# Patient Record
Sex: MALE | Race: WHITE | NOT HISPANIC OR LATINO | Employment: OTHER | URBAN - METROPOLITAN AREA
[De-identification: names, ages, dates, MRNs, and addresses within clinical notes are randomized per-mention and may not be internally consistent; named-entity substitution may affect disease eponyms.]

---
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
## 2018-09-12 DIAGNOSIS — T17.320A CHOKING DUE TO FOOD (REGURGITATED): Primary | ICD-10-CM

## 2020-04-05 ENCOUNTER — HOSPITAL ENCOUNTER (INPATIENT)
Facility: HOSPITAL | Age: 85
LOS: 5 days | DRG: 177 | End: 2020-04-10
Attending: EMERGENCY MEDICINE | Admitting: EMERGENCY MEDICINE
Payer: MEDICARE

## 2020-04-05 DIAGNOSIS — U07.1 COVID-19 VIRUS INFECTION: ICD-10-CM

## 2020-04-05 DIAGNOSIS — J18.9 MULTIFOCAL PNEUMONIA: Primary | ICD-10-CM

## 2020-04-05 DIAGNOSIS — Z20.822 SUSPECTED COVID-19 VIRUS INFECTION: ICD-10-CM

## 2020-04-05 DIAGNOSIS — D64.9 ANEMIA REQUIRING TRANSFUSIONS: ICD-10-CM

## 2020-04-05 DIAGNOSIS — J44.9 CHRONIC OBSTRUCTIVE PULMONARY DISEASE, UNSPECIFIED COPD TYPE: ICD-10-CM

## 2020-04-05 PROBLEM — K21.9 GERD (GASTROESOPHAGEAL REFLUX DISEASE): Status: ACTIVE | Noted: 2020-04-05

## 2020-04-05 LAB
ABO + RH BLD: NORMAL
ALBUMIN SERPL BCP-MCNC: 2.6 G/DL (ref 3.5–5.2)
ALP SERPL-CCNC: 102 U/L (ref 55–135)
ALT SERPL W/O P-5'-P-CCNC: 53 U/L (ref 10–44)
AMORPH CRY UR QL COMP ASSIST: NORMAL
ANION GAP SERPL CALC-SCNC: 10 MMOL/L (ref 8–16)
AST SERPL-CCNC: 101 U/L (ref 10–40)
BACTERIA #/AREA URNS AUTO: NORMAL /HPF
BASOPHILS # BLD AUTO: 0.01 K/UL (ref 0–0.2)
BASOPHILS NFR BLD: 0.1 % (ref 0–1.9)
BILIRUB SERPL-MCNC: 0.9 MG/DL (ref 0.1–1)
BILIRUB UR QL STRIP: NEGATIVE
BLD GP AB SCN CELLS X3 SERPL QL: NORMAL
BUN SERPL-MCNC: 23 MG/DL (ref 8–23)
CALCIUM SERPL-MCNC: 8.2 MG/DL (ref 8.7–10.5)
CHLORIDE SERPL-SCNC: 104 MMOL/L (ref 95–110)
CK SERPL-CCNC: 544 U/L (ref 20–200)
CLARITY UR REFRACT.AUTO: ABNORMAL
CO2 SERPL-SCNC: 24 MMOL/L (ref 23–29)
COLOR UR AUTO: YELLOW
CREAT SERPL-MCNC: 1.5 MG/DL (ref 0.5–1.4)
CREAT UR-MCNC: 117 MG/DL (ref 23–375)
CRP SERPL-MCNC: 191.5 MG/L (ref 0–8.2)
DIFFERENTIAL METHOD: ABNORMAL
EOSINOPHIL # BLD AUTO: 0 K/UL (ref 0–0.5)
EOSINOPHIL NFR BLD: 0.5 % (ref 0–8)
ERYTHROCYTE [DISTWIDTH] IN BLOOD BY AUTOMATED COUNT: 24.3 % (ref 11.5–14.5)
EST. GFR  (AFRICAN AMERICAN): 48 ML/MIN/1.73 M^2
EST. GFR  (NON AFRICAN AMERICAN): 41.6 ML/MIN/1.73 M^2
FERRITIN SERPL-MCNC: 81 NG/ML (ref 20–300)
GLUCOSE SERPL-MCNC: 109 MG/DL (ref 70–110)
GLUCOSE UR QL STRIP: NEGATIVE
HCT VFR BLD AUTO: 22.7 % (ref 40–54)
HGB BLD-MCNC: 6.1 G/DL (ref 14–18)
HGB UR QL STRIP: ABNORMAL
HYALINE CASTS UR QL AUTO: 0 /LPF
IMM GRANULOCYTES # BLD AUTO: 0.16 K/UL (ref 0–0.04)
IMM GRANULOCYTES NFR BLD AUTO: 1.9 % (ref 0–0.5)
IRON SERPL-MCNC: 16 UG/DL (ref 45–160)
KETONES UR QL STRIP: NEGATIVE
LACTATE SERPL-SCNC: 0.9 MMOL/L (ref 0.5–2.2)
LDH SERPL L TO P-CCNC: 436 U/L (ref 110–260)
LEUKOCYTE ESTERASE UR QL STRIP: NEGATIVE
LYMPHOCYTES # BLD AUTO: 0.7 K/UL (ref 1–4.8)
LYMPHOCYTES NFR BLD: 8.4 % (ref 18–48)
MCH RBC QN AUTO: 19.1 PG (ref 27–31)
MCHC RBC AUTO-ENTMCNC: 26.9 G/DL (ref 32–36)
MCV RBC AUTO: 71 FL (ref 82–98)
MICROSCOPIC COMMENT: NORMAL
MONOCYTES # BLD AUTO: 0.5 K/UL (ref 0.3–1)
MONOCYTES NFR BLD: 5.5 % (ref 4–15)
NEUTROPHILS # BLD AUTO: 7 K/UL (ref 1.8–7.7)
NEUTROPHILS NFR BLD: 83.6 % (ref 38–73)
NITRITE UR QL STRIP: NEGATIVE
NRBC BLD-RTO: 2 /100 WBC
PH UR STRIP: 5 [PH] (ref 5–8)
PLATELET # BLD AUTO: 253 K/UL (ref 150–350)
PMV BLD AUTO: ABNORMAL FL (ref 9.2–12.9)
POTASSIUM SERPL-SCNC: 4.2 MMOL/L (ref 3.5–5.1)
PROCALCITONIN SERPL IA-MCNC: 8.46 NG/ML
PROT SERPL-MCNC: 7.5 G/DL (ref 6–8.4)
PROT UR QL STRIP: ABNORMAL
PROT UR-MCNC: 65 MG/DL (ref 0–15)
PROT/CREAT UR: 0.56 MG/G{CREAT} (ref 0–0.2)
RBC # BLD AUTO: 3.2 M/UL (ref 4.6–6.2)
RBC #/AREA URNS AUTO: 1 /HPF (ref 0–4)
RETICS/RBC NFR AUTO: 1.2 % (ref 0.4–2)
SARS-COV-2 RNA AMPLIFICATION, QUAL: POSITIVE
SATURATED IRON: 5 % (ref 20–50)
SODIUM SERPL-SCNC: 138 MMOL/L (ref 136–145)
SODIUM UR-SCNC: <20 MMOL/L (ref 20–250)
SP GR UR STRIP: 1.02 (ref 1–1.03)
SQUAMOUS #/AREA URNS AUTO: 0 /HPF
TOTAL IRON BINDING CAPACITY: 292 UG/DL (ref 250–450)
TRANSFERRIN SERPL-MCNC: 197 MG/DL (ref 200–375)
TROPONIN I SERPL DL<=0.01 NG/ML-MCNC: 0.02 NG/ML (ref 0–0.03)
URN SPEC COLLECT METH UR: ABNORMAL
UUN UR-MCNC: 776 MG/DL (ref 140–1050)
WBC # BLD AUTO: 8.35 K/UL (ref 3.9–12.7)
WBC #/AREA URNS AUTO: 3 /HPF (ref 0–5)

## 2020-04-05 PROCEDURE — 80053 COMPREHEN METABOLIC PANEL: CPT

## 2020-04-05 PROCEDURE — 63700000 PHARM REV CODE 250 ALT 637 W/O HCPCS: Performed by: PHYSICIAN ASSISTANT

## 2020-04-05 PROCEDURE — 85025 COMPLETE CBC W/AUTO DIFF WBC: CPT

## 2020-04-05 PROCEDURE — 86140 C-REACTIVE PROTEIN: CPT

## 2020-04-05 PROCEDURE — 93010 ELECTROCARDIOGRAM REPORT: CPT | Mod: ,,, | Performed by: INTERNAL MEDICINE

## 2020-04-05 PROCEDURE — 99285 EMERGENCY DEPT VISIT HI MDM: CPT | Mod: 25

## 2020-04-05 PROCEDURE — P9021 RED BLOOD CELLS UNIT: HCPCS

## 2020-04-05 PROCEDURE — 82550 ASSAY OF CK (CPK): CPT

## 2020-04-05 PROCEDURE — 82607 VITAMIN B-12: CPT

## 2020-04-05 PROCEDURE — 82570 ASSAY OF URINE CREATININE: CPT

## 2020-04-05 PROCEDURE — 83010 ASSAY OF HAPTOGLOBIN QUANT: CPT

## 2020-04-05 PROCEDURE — 86850 RBC ANTIBODY SCREEN: CPT

## 2020-04-05 PROCEDURE — 85045 AUTOMATED RETICULOCYTE COUNT: CPT

## 2020-04-05 PROCEDURE — 84540 ASSAY OF URINE/UREA-N: CPT

## 2020-04-05 PROCEDURE — 96374 THER/PROPH/DIAG INJ IV PUSH: CPT

## 2020-04-05 PROCEDURE — 87449 NOS EACH ORGANISM AG IA: CPT

## 2020-04-05 PROCEDURE — U0002 COVID-19 LAB TEST NON-CDC: HCPCS

## 2020-04-05 PROCEDURE — 93005 ELECTROCARDIOGRAM TRACING: CPT

## 2020-04-05 PROCEDURE — 83605 ASSAY OF LACTIC ACID: CPT

## 2020-04-05 PROCEDURE — 84300 ASSAY OF URINE SODIUM: CPT

## 2020-04-05 PROCEDURE — 93010 EKG 12-LEAD: ICD-10-PCS | Mod: ,,, | Performed by: INTERNAL MEDICINE

## 2020-04-05 PROCEDURE — 63600175 PHARM REV CODE 636 W HCPCS: Performed by: PHYSICIAN ASSISTANT

## 2020-04-05 PROCEDURE — 86920 COMPATIBILITY TEST SPIN: CPT

## 2020-04-05 PROCEDURE — 83615 LACTATE (LD) (LDH) ENZYME: CPT

## 2020-04-05 PROCEDURE — 82960 TEST FOR G6PD ENZYME: CPT

## 2020-04-05 PROCEDURE — 82746 ASSAY OF FOLIC ACID SERUM: CPT

## 2020-04-05 PROCEDURE — 84484 ASSAY OF TROPONIN QUANT: CPT

## 2020-04-05 PROCEDURE — 83880 ASSAY OF NATRIURETIC PEPTIDE: CPT

## 2020-04-05 PROCEDURE — 12000002 HC ACUTE/MED SURGE SEMI-PRIVATE ROOM

## 2020-04-05 PROCEDURE — 84145 PROCALCITONIN (PCT): CPT

## 2020-04-05 PROCEDURE — 83540 ASSAY OF IRON: CPT

## 2020-04-05 PROCEDURE — 99233 PR SUBSEQUENT HOSPITAL CARE,LEVL III: ICD-10-PCS | Mod: ,,, | Performed by: FAMILY MEDICINE

## 2020-04-05 PROCEDURE — 81001 URINALYSIS AUTO W/SCOPE: CPT

## 2020-04-05 PROCEDURE — 82728 ASSAY OF FERRITIN: CPT

## 2020-04-05 PROCEDURE — 87040 BLOOD CULTURE FOR BACTERIA: CPT | Mod: 59

## 2020-04-05 PROCEDURE — 85379 FIBRIN DEGRADATION QUANT: CPT

## 2020-04-05 PROCEDURE — 36430 TRANSFUSION BLD/BLD COMPNT: CPT

## 2020-04-05 PROCEDURE — 99285 EMERGENCY DEPT VISIT HI MDM: CPT | Mod: ,,, | Performed by: PHYSICIAN ASSISTANT

## 2020-04-05 PROCEDURE — 99285 PR EMERGENCY DEPT VISIT,LEVEL V: ICD-10-PCS | Mod: ,,, | Performed by: PHYSICIAN ASSISTANT

## 2020-04-05 PROCEDURE — 99233 SBSQ HOSP IP/OBS HIGH 50: CPT | Mod: ,,, | Performed by: FAMILY MEDICINE

## 2020-04-05 RX ORDER — AZITHROMYCIN 250 MG/1
250 TABLET, FILM COATED ORAL
Status: COMPLETED | OUTPATIENT
Start: 2020-04-06 | End: 2020-04-09

## 2020-04-05 RX ORDER — GLUCAGON 1 MG
1 KIT INJECTION
Status: DISCONTINUED | OUTPATIENT
Start: 2020-04-05 | End: 2020-04-10 | Stop reason: HOSPADM

## 2020-04-05 RX ORDER — CEFTRIAXONE 1 G/1
1 INJECTION, POWDER, FOR SOLUTION INTRAMUSCULAR; INTRAVENOUS
Status: COMPLETED | OUTPATIENT
Start: 2020-04-05 | End: 2020-04-05

## 2020-04-05 RX ORDER — SODIUM CHLORIDE 0.9 % (FLUSH) 0.9 %
10 SYRINGE (ML) INJECTION
Status: DISCONTINUED | OUTPATIENT
Start: 2020-04-05 | End: 2020-04-10 | Stop reason: HOSPADM

## 2020-04-05 RX ORDER — IBUPROFEN 200 MG
24 TABLET ORAL
Status: DISCONTINUED | OUTPATIENT
Start: 2020-04-05 | End: 2020-04-10 | Stop reason: HOSPADM

## 2020-04-05 RX ORDER — SODIUM CHLORIDE 0.9 % (FLUSH) 0.9 %
10 SYRINGE (ML) INJECTION
Status: CANCELLED | OUTPATIENT
Start: 2020-04-05

## 2020-04-05 RX ORDER — HYDROCODONE BITARTRATE AND ACETAMINOPHEN 500; 5 MG/1; MG/1
TABLET ORAL
Status: DISCONTINUED | OUTPATIENT
Start: 2020-04-05 | End: 2020-04-10 | Stop reason: HOSPADM

## 2020-04-05 RX ORDER — ALBUTEROL SULFATE 90 UG/1
2 AEROSOL, METERED RESPIRATORY (INHALATION) EVERY 8 HOURS
Status: DISCONTINUED | OUTPATIENT
Start: 2020-04-06 | End: 2020-04-07

## 2020-04-05 RX ORDER — ENOXAPARIN SODIUM 100 MG/ML
30 INJECTION SUBCUTANEOUS NIGHTLY
Status: DISCONTINUED | OUTPATIENT
Start: 2020-04-06 | End: 2020-04-07

## 2020-04-05 RX ORDER — IBUPROFEN 200 MG
16 TABLET ORAL
Status: DISCONTINUED | OUTPATIENT
Start: 2020-04-05 | End: 2020-04-10 | Stop reason: HOSPADM

## 2020-04-05 RX ORDER — AZITHROMYCIN 250 MG/1
500 TABLET, FILM COATED ORAL
Status: COMPLETED | OUTPATIENT
Start: 2020-04-05 | End: 2020-04-05

## 2020-04-05 RX ORDER — CEFTRIAXONE 1 G/1
1 INJECTION, POWDER, FOR SOLUTION INTRAMUSCULAR; INTRAVENOUS ONCE
Status: COMPLETED | OUTPATIENT
Start: 2020-04-06 | End: 2020-04-06

## 2020-04-05 RX ADMIN — AZITHROMYCIN MONOHYDRATE 500 MG: 250 TABLET ORAL at 10:04

## 2020-04-05 RX ADMIN — CEFTRIAXONE SODIUM 1 G: 1 INJECTION, POWDER, FOR SOLUTION INTRAMUSCULAR; INTRAVENOUS at 10:04

## 2020-04-06 LAB
BASOPHILS # BLD AUTO: 0.02 K/UL (ref 0–0.2)
BASOPHILS NFR BLD: 0.2 % (ref 0–1.9)
BNP SERPL-MCNC: 48 PG/ML (ref 0–99)
D DIMER PPP IA.FEU-MCNC: 0.91 MG/L FEU
DIFFERENTIAL METHOD: ABNORMAL
EOSINOPHIL # BLD AUTO: 0 K/UL (ref 0–0.5)
EOSINOPHIL NFR BLD: 0.3 % (ref 0–8)
ERYTHROCYTE [DISTWIDTH] IN BLOOD BY AUTOMATED COUNT: 26.1 % (ref 11.5–14.5)
FOLATE SERPL-MCNC: 10.8 NG/ML (ref 4–24)
GLUCOSE-6-PHOSPHATE DEHYDROGENASE QUAL: ABNORMAL
HAPTOGLOB SERPL-MCNC: 400 MG/DL (ref 30–250)
HCT VFR BLD AUTO: 29.9 % (ref 40–54)
HGB BLD-MCNC: 8.4 G/DL (ref 14–18)
IMM GRANULOCYTES # BLD AUTO: 0.26 K/UL (ref 0–0.04)
IMM GRANULOCYTES NFR BLD AUTO: 2.4 % (ref 0–0.5)
INFLUENZA A, MOLECULAR: NEGATIVE
INFLUENZA B, MOLECULAR: NEGATIVE
LYMPHOCYTES # BLD AUTO: 0.8 K/UL (ref 1–4.8)
LYMPHOCYTES NFR BLD: 7 % (ref 18–48)
MCH RBC QN AUTO: 20.9 PG (ref 27–31)
MCHC RBC AUTO-ENTMCNC: 28.1 G/DL (ref 32–36)
MCV RBC AUTO: 74 FL (ref 82–98)
MONOCYTES # BLD AUTO: 0.5 K/UL (ref 0.3–1)
MONOCYTES NFR BLD: 4.3 % (ref 4–15)
NEUTROPHILS # BLD AUTO: 9.3 K/UL (ref 1.8–7.7)
NEUTROPHILS NFR BLD: 85.8 % (ref 38–73)
NRBC BLD-RTO: 1 /100 WBC
PLATELET # BLD AUTO: 273 K/UL (ref 150–350)
PMV BLD AUTO: ABNORMAL FL (ref 9.2–12.9)
RBC # BLD AUTO: 4.02 M/UL (ref 4.6–6.2)
SPECIMEN SOURCE: NORMAL
VIT B12 SERPL-MCNC: 790 PG/ML (ref 210–950)
WBC # BLD AUTO: 10.87 K/UL (ref 3.9–12.7)

## 2020-04-06 PROCEDURE — 27000221 HC OXYGEN, UP TO 24 HOURS

## 2020-04-06 PROCEDURE — 63700000 PHARM REV CODE 250 ALT 637 W/O HCPCS: Performed by: STUDENT IN AN ORGANIZED HEALTH CARE EDUCATION/TRAINING PROGRAM

## 2020-04-06 PROCEDURE — 99233 PR SUBSEQUENT HOSPITAL CARE,LEVL III: ICD-10-PCS | Mod: ,,, | Performed by: FAMILY MEDICINE

## 2020-04-06 PROCEDURE — 99233 SBSQ HOSP IP/OBS HIGH 50: CPT | Mod: ,,, | Performed by: FAMILY MEDICINE

## 2020-04-06 PROCEDURE — 36415 COLL VENOUS BLD VENIPUNCTURE: CPT

## 2020-04-06 PROCEDURE — 87502 INFLUENZA DNA AMP PROBE: CPT

## 2020-04-06 PROCEDURE — 94640 AIRWAY INHALATION TREATMENT: CPT

## 2020-04-06 PROCEDURE — 25000242 PHARM REV CODE 250 ALT 637 W/ HCPCS: Performed by: FAMILY MEDICINE

## 2020-04-06 PROCEDURE — 94761 N-INVAS EAR/PLS OXIMETRY MLT: CPT

## 2020-04-06 PROCEDURE — 63600175 PHARM REV CODE 636 W HCPCS: Performed by: FAMILY MEDICINE

## 2020-04-06 PROCEDURE — 63600175 PHARM REV CODE 636 W HCPCS: Performed by: STUDENT IN AN ORGANIZED HEALTH CARE EDUCATION/TRAINING PROGRAM

## 2020-04-06 PROCEDURE — 85025 COMPLETE CBC W/AUTO DIFF WBC: CPT

## 2020-04-06 PROCEDURE — 11000001 HC ACUTE MED/SURG PRIVATE ROOM

## 2020-04-06 RX ORDER — FLUTICASONE FUROATE AND VILANTEROL 200; 25 UG/1; UG/1
1 POWDER RESPIRATORY (INHALATION) DAILY
Status: DISCONTINUED | OUTPATIENT
Start: 2020-04-06 | End: 2020-04-10 | Stop reason: HOSPADM

## 2020-04-06 RX ORDER — CEFTRIAXONE 1 G/1
1 INJECTION, POWDER, FOR SOLUTION INTRAMUSCULAR; INTRAVENOUS
Status: COMPLETED | OUTPATIENT
Start: 2020-04-07 | End: 2020-04-09

## 2020-04-06 RX ADMIN — AZITHROMYCIN MONOHYDRATE 250 MG: 250 TABLET ORAL at 05:04

## 2020-04-06 RX ADMIN — CEFTRIAXONE SODIUM 1 G: 1 INJECTION, POWDER, FOR SOLUTION INTRAMUSCULAR; INTRAVENOUS at 05:04

## 2020-04-06 RX ADMIN — FLUTICASONE FUROATE AND VILANTEROL TRIFENATATE 1 PUFF: 200; 25 POWDER RESPIRATORY (INHALATION) at 12:04

## 2020-04-06 RX ADMIN — ENOXAPARIN SODIUM 30 MG: 100 INJECTION SUBCUTANEOUS at 07:04

## 2020-04-06 RX ADMIN — ALBUTEROL SULFATE 2 PUFF: 90 AEROSOL, METERED RESPIRATORY (INHALATION) at 09:04

## 2020-04-06 RX ADMIN — VANCOMYCIN HYDROCHLORIDE 750 MG: 750 INJECTION, POWDER, LYOPHILIZED, FOR SOLUTION INTRAVENOUS at 11:04

## 2020-04-06 RX ADMIN — ALBUTEROL SULFATE 2 PUFF: 90 AEROSOL, METERED RESPIRATORY (INHALATION) at 08:04

## 2020-04-06 RX ADMIN — IPRATROPIUM BROMIDE 2 PUFF: 17 AEROSOL, METERED RESPIRATORY (INHALATION) at 04:04

## 2020-04-06 RX ADMIN — IPRATROPIUM BROMIDE 2 PUFF: 17 AEROSOL, METERED RESPIRATORY (INHALATION) at 08:04

## 2020-04-06 NOTE — PROGRESS NOTES
Hospital Medicine  Progress Note  [facility]      Patient Name: Cedric Almaraz  MRN:  [id number]  Hospital Medicine Team: [facility] J Malachi Terrazas MD  Date of Admission:  4/5/2020     Length of Stay:  LOS: 1 day       Principal Problem:  <principal problem not specified>      HPI:  86yoM PMHx COPD, anemia, GERD presents to [facility] from [facility] for SOB. Patient with paperwork from a clinic stating that he had a chest x-ray performed day prior to admission which showed bibasilar infiltrates after complaining of dyspnea, his O2 sat at that time was 94%. The patient reports cough and pain in both shoulders but denies any other complaints including fever, chills, chest pain, abdominal pain, and nausea/vomiting/diarrhea.    Hospital Course:  Patient admitted for evaluation of possible COVID-19. Positive.    Interval History:     AAOx3. Goals of care discussion today with patient and expressing desire for full code at this time. Will touch base with family later today. Pt on 2L NC mid 90s sats. Afebrile. Pt anemic on arrival and Hgb recovered s/p 1U PRBC.    Review of Systems:  Respiratory: sob  Cardiovascular: no cp  GI: no diarrhea    Inpatient Medications:    Current Facility-Administered Medications:     0.9%  NaCl infusion (for blood administration), , Intravenous, Q24H PRN, Kerethi Nascimento PA-C    albuterol inhaler 2 puff, 2 puff, Inhalation, Q8H, 2 puff at 04/06/20 0907 **AND** MDI Q6H PRN, , , Q6H PRN, Malachi Terrazas MD    azithromycin tablet 250 mg, 250 mg, Oral, Q24H, Malachi Terrazas MD, 250 mg at 04/06/20 0501    dextrose 10% (D10W) Bolus, 12.5 g, Intravenous, PRN, Malachi Terrazas MD    dextrose 10% (D10W) Bolus, 25 g, Intravenous, PRN, Malachi Terrazas MD    enoxaparin injection 30 mg, 30 mg, Subcutaneous, QHS, Malachi Terrazas MD    fluticasone furoate-vilanteroL 200-25 mcg/dose diskus inhaler 1 puff, 1 puff, Inhalation, Daily, Malachi Terrazas,  "MD    glucagon (human recombinant) injection 1 mg, 1 mg, Intramuscular, PRN, Malachi Terrazas MD    glucose chewable tablet 16 g, 16 g, Oral, PRN, Malachi Terrazas MD    glucose chewable tablet 24 g, 24 g, Oral, PRN, Malachi Terrazas MD    ipratropium inhaler 2 puff, 2 puff, Inhalation, Q6H WAKE **AND** MDI Q6H PRN, , , Q6H PRN, Malachi Terrazas MD    sodium chloride 0.9% flush 10 mL, 10 mL, Intravenous, PRN, Malachi Terrazas MD      Physical Exam:    No intake or output data in the 24 hours ending 04/06/20 1042  Wt Readings from Last 3 Encounters:   04/06/20 61.2 kg (134 lb 14.7 oz)       /68 (BP Location: Left arm, Patient Position: Lying)   Pulse 78   Temp 97.8 °F (36.6 °C) (Oral)   Resp 20   Ht 5' 5" (1.651 m)   Wt 61.2 kg (134 lb 14.7 oz)   SpO2 (!) 90%   BMI 22.45 kg/m²     GEN: NAD, conversant  Resp: coarse bilateral breath sounds, normal wob on nc  CV: no edema  Skin: no rash    Laboratory:  Lab Results   Component Value Date    [id number] Positive (A) 04/05/2020       Recent Labs   Lab 04/05/20 2127 04/06/20  0717   WBC 8.35 10.87   LYMPH 8.4*  0.7* 7.0*  0.8*   HGB 6.1* 8.4*   HCT 22.7* 29.9*    273     Recent Labs   Lab 04/05/20 2127      K 4.2      CO2 24   BUN 23   CREATININE 1.5*      CALCIUM 8.2*     Recent Labs   Lab 04/05/20 2127   ALKPHOS 102   ALT 53*   *   ALBUMIN 2.6*   PROT 7.5   BILITOT 0.9        Recent Labs     04/05/20 2127   DDIMER 0.91*   FERRITIN 81   .5*   *   BNP 48   TROPONINI 0.022   *       All labs within the last 24 hours were reviewed.     Microbiology:  Microbiology Results (last 7 days)     Procedure Component Value Units Date/Time    Blood culture x two cultures. Draw prior to antibiotics. [[id number]] Collected:  04/05/20 2131    Order Status:  Completed Specimen:  Blood from Peripheral, Forearm, Right Updated:  04/06/20 0515     Blood Culture, Routine No Growth to date    Narrative:       Aerobic " and anaerobic    Blood culture x two cultures. Draw prior to antibiotics. [[id number]] Collected:  04/05/20 2130    Order Status:  Completed Specimen:  Blood from Peripheral, Antecubital, Right Updated:  04/06/20 0515     Blood Culture, Routine No Growth to date    Narrative:       Aerobic and anaerobic    Influenza A & B by Molecular [[id number]] Collected:  04/06/20 0011    Order Status:  Completed Specimen:  Nasopharyngeal Swab Updated:  04/06/20 0046     Influenza A, Molecular Negative     Influenza B, Molecular Negative     Flu A & B Source NP    Culture, Respiratory with Gram Stain [[id number]]     Order Status:  No result Specimen:  Sputum, Expectorated             Imaging  ECG Results          EKG 12-lead (Final result)  Result time 04/06/20 10:03:38    Final result by Interface, Lab In [place] (04/06/20 10:03:38)                 Narrative:    Test Reason : R68.89,    Vent. Rate : 055 BPM     Atrial Rate : 055 BPM     P-R Int : 132 ms          QRS Dur : 078 ms      QT Int : 482 ms       P-R-T Axes : 065 041 064 degrees     QTc Int : 461 ms    Sinus bradycardia with Premature atrial complexes  Otherwise normal ECG  When compared with ECG of 16-JAN-2012 12:33,  Premature atrial complexes are now Present  Confirmed by ANTONI MAN MD ([id number]) on 4/6/2020 10:03:30 AM    Referred By: [email or web address]   SELF           Confirmed By:ANTONI MAN MD                              No results found for this or any previous visit.    X-Ray Chest AP Portable  Narrative: EXAMINATION:  XR CHEST AP PORTABLE    CLINICAL HISTORY:  Suspected Covid-19 Virus Infection;    TECHNIQUE:  Single frontal view of the chest was performed.    COMPARISON:  01/16/2012.    FINDINGS:  Monitoring EKG leads are present. The trachea is unremarkable. There are calcifications of the aortic knob.  The cardiomediastinal silhouette is at the upper limits of normal. The right hemidiaphragm is unremarkable.  There is elevation of left hemidiaphragm.  There  is no evidence of free air beneath the hemidiaphragms. There is no evidence of a pneumothorax.  There is no evidence of pneumomediastinum.  There are bibasilar ground-glass airspace opacities.  There are degenerative changes in the osseous structures.  Impression: Bibasilar ground-glass airspace opacities.  The findings may be seen with viral or bacterial pneumonia.    Electronically signed by: Juan Best MD  Date:    04/05/2020  Time:    21:54      All imaging within the last 24 hours was reviewed.     Assessment and Plan:    Active Hospital Problems    Diagnosis  POA    COPD (chronic obstructive pulmonary disease) [J44.9]  Yes    Anemia [D64.9]  Yes    GERD (gastroesophageal reflux disease) [K21.9]  Yes    Multifocal pneumonia [J18.9]  Yes      Resolved Hospital Problems   No resolved problems to display.       Covid-19 Virus Infection  - COVID-19 testing: positive  - Infection Control notified    - Isolation:   - Airborne and Droplet Precautions  - Surgical mask on patient   - N95 masks must be fit tested, wear eye protection  - 20 second hand hygiene   - Limit visitors per hospital policy   - Consolidate lab draws, nursing care, and interventions    - Diagnostics: (Lymphopenia, hyponatremia, hyperferritinemia, elevated troponin, elevated d-dimer, age, and comorbidities are significant predictors of poor clinical outcome)   - CBC: Hgb 8.4 (6.1); Lymph 0.8  trend Q48hrs  - CMP:   Cr 1.5; elev LFTs     trend Q48hrs  - Procalcitonin:  - D-dimer:0.91  repeat prior to discharge  - Ferritin:81  repeat prior to discharge   - CRP:    191    trend Q48hrs  - LDH: 436  repeat prior to discharge  - BNP:48  - Troponin:wnl    - ECG:qtc 461   - rapid Flu:neg   - RIP only if BMT/solid transplant:   - Legionella antigen:   - Blood culture x2:ngtd   - Sputum culture:   - CXR:as above   - UA and culture:   - CPK:544    - Management:   Bundle care as able to maintain isolation & minimize in/out of room   - Supplemental O2 to  maintain SpO2 92%-96%   if requiring 6L NC or higher, place on nonrebreather and discuss case with MICU   - Telemetry & continuous pulse oximetry    - If wheezing   - albuterol inhaler 2-4 puff Q6hr PRN    - ipratropium daily    - acetaminophen 650mg PO Q6hr PRN fever   - loperamide PRN for viral diarrhea  - Empiric antibiotics per likely source & patient allergies    - CAP: x 5 day course (d/c early if low concern for bacterial co-infection)  Ceftriaxone 1g IV Q24hrs            Azithromycin 500mg IV day #1, then 250mg PO daily x4 days     If azithromycin is not available, start doxycycline                 If MRSA risk factors, add Vancomycin IV (PharmD consult)     - Investigational Treatment Protocol: (if patient meets criteria)   [email or web address]     - statin: atorvastatin 40mg po daily (if CPK WNL)    - start HCQ 400mg PO BID x1 day, then 400mg PO daily x 4 days   (check glucose 6 phosphate dehydrogenase (NOT G6PD Quant), ECG on start & @48hrs, and start Qshift POCT glucose)    Safety notes:   - Avoid NIPPV (CPAP/BiPAP) to prevent aerosolization, use on a case-by-case basis if in neg pressure room   - Cautious use of NSAIDS for fever per WHO recommendations (3/16/2020)   - No new ACEi/ARB start or discontinuation of chronic med unless hypotensive (Esler et al. Journal of Hypertension 2020, 38:000-000)   - Careful use of steroids in the absence of other indications (shock, ARDS)   - Fluid sparing resuscitation, avoid maintenance fluids           Advance Care Planning  Goals of care, counseling/discussion   full  Advance Care Planning      Anemia  - patient presents with microcytic anemia  - ED ordered 1U pRBCs --> Hgb recovered to 8.4, cont to monitor.     Elevated Creatinine   - pt with Cr 1.5 on admission, unknown baseline (last Cr 1.2 in Epic)  - trend CMP q48    If patient transitions to Comfort-Focused Care,  "please place "Nurse Communication: End of Life Care, family members allowed to visit, including spouse/partner and adult children [please list names]. Please ask family to visit as a group and leave as a group.       VTE High Risk Prophylaxis: enoxaparin 40mg sq QHS @ 2100 (bundled care) if GFR >30    Patient's chronic/stable medical conditions noted in the problem list above will be managed with the patient's home medications as tolerated.       Level 3 [id number] Total visit time was 35 minutes or greater with greater than 50% of time spent in counseling and coordination of care.       Malachi Terrazas MD  Spectralink: [phone]  Pager: [phone]          "

## 2020-04-06 NOTE — PLAN OF CARE
CM called [facility] for discharge planning.  Nurse at [facility] states patient is halfway and is wheelchair bound.  He is able to help a little bit but needed more help than usual, that is when they knew something was wrong.  CM received patient's daughter's information, Sophia Hayward [phone] or [phone].  Information placed in chart under demographics.  Plan is to discharge back to nursing home.    PCP:  Dave Goldberg MD    Payor: MEDICARE / Plan: MEDICARE PART A & B / Product Type: [facility] /      04/06/20 1235   Discharge Assessment   Assessment Type Discharge Planning Assessment   Confirmed/corrected address and phone number on facesheet? Yes   Assessment information obtained from? Caregiver   Expected Length of Stay (days) 3   Facility Arrived From: [facility]   Lives With facility resident   Is patient able to care for self after discharge? Unable to determine at this time (comments)   Patient's perception of discharge disposition nursing home   Readmission Within the Last 30 Days no previous admission in last 30 days   Patient currently being followed by outpatient case management? No   Patient currently receives any other outside agency services? No   Equipment Currently Used at Home wheelchair  (Nursing Home Equipment)   Do you have any problems affording any of your prescribed medications? No   Is the patient taking medications as prescribed? yes   Does the patient receive services at [facility]? No   Discharge Plan A Return to nursing home   Discharge Plan B New Nursing Home placement - halfway care facility   DME Needed Upon Discharge  none   Patient/Family in Agreement with Plan yes

## 2020-04-06 NOTE — ED PROVIDER NOTES
Encounter Date: 4/5/2020       History     Chief Complaint   Patient presents with    Shortness of Breath     SOB, low o2 sat 89% on RA, hx of pneumonia     86-year-old male with COPD, anemia, GERD presents via EMS from [facility] for shortness of breath.  History is obtained mainly from EMS provider and medical record as the patient is very poor historian.  Patient presents with paperwork from a clinic stating that he had a chest x-ray performed yesterday which showed bibasilar infiltrates and was mildly hypoxic with O2 saturation of 94% on room air.  The patient does endorse having a cough and pain in his right shoulder but denies any other complaints.  Denies fever, shortness of breath chest pain, abdominal pain, nausea/vomiting/diarrhea other complaints.        Review of patient's allergies indicates:  No Known Allergies  Past Medical History:   Diagnosis Date    Anemia 4/5/2020    GERD (gastroesophageal reflux disease) 4/5/2020     No past surgical history on file.  No family history on file.  Social History     Tobacco Use    Smoking status: Not on file   Substance Use Topics    Alcohol use: Not on file    Drug use: Not on file     Review of Systems   Constitutional: Negative for chills, fatigue and fever.   HENT: Negative for sore throat.    Respiratory: Positive for cough. Negative for shortness of breath.    Cardiovascular: Negative for chest pain.   Gastrointestinal: Negative for abdominal pain, diarrhea, nausea and vomiting.   Genitourinary: Negative for dysuria.   Musculoskeletal: Positive for arthralgias. Negative for back pain and myalgias.   Skin: Negative for rash.   Neurological: Negative for weakness.   Hematological: Does not bruise/bleed easily.       Physical Exam     Initial Vitals [04/05/20 2055]   BP Pulse Resp Temp SpO2   (!) 98/54 95 (!) 24 99.8 °F (37.7 °C) (!) 94 %      MAP       --         Physical Exam    Nursing note and vitals reviewed.  Constitutional: He  appears well-developed and well-nourished. He is not diaphoretic. No distress.   HENT:   Head: Normocephalic and atraumatic.   Eyes: EOM are normal. Pupils are equal, round, and reactive to light.   Neck: Normal range of motion. Neck supple.   Cardiovascular: Normal rate, regular rhythm, normal heart sounds and intact distal pulses. Exam reveals no gallop and no friction rub.    No murmur heard.  Pulmonary/Chest: Tachypnea noted. No respiratory distress.   Very mildly tachypneic, speaking full sentences without difficulty   Abdominal: Soft. He exhibits no distension. There is no tenderness.   Genitourinary: Rectal exam shows guaiac negative stool. Guaiac negative stool.   Genitourinary Comments: RN present as chaperone for rectal exam.  Light brown stool in rectal vault, no omer blood or melena.  Guaiac negative   Musculoskeletal: Normal range of motion.        Right lower leg: Normal.        Left lower leg: Normal.   Neurological: He is alert and oriented to person, place, and time.   Skin: Skin is warm and dry.   Psychiatric: He has a normal mood and affect.         ED Course   Procedures  Labs Reviewed   CBC W/ AUTO DIFFERENTIAL - Abnormal; Notable for the following components:       Result Value    RBC 3.20 (*)     Hemoglobin 6.1 (*)     Hematocrit 22.7 (*)     Mean Corpuscular Volume 71 (*)     Mean Corpuscular Hemoglobin 19.1 (*)     Mean Corpuscular Hemoglobin Conc 26.9 (*)     RDW 24.3 (*)     Immature Granulocytes 1.9 (*)     Immature Grans (Abs) 0.16 (*)     Lymph # 0.7 (*)     nRBC 2 (*)     Gran% 83.6 (*)     Lymph% 8.4 (*)     All other components within normal limits   COMPREHENSIVE METABOLIC PANEL - Abnormal; Notable for the following components:    Creatinine 1.5 (*)     Calcium 8.2 (*)     Albumin 2.6 (*)      (*)     ALT 53 (*)     eGFR if  48.0 (*)     eGFR if non  41.6 (*)     All other components within normal limits   C-REACTIVE PROTEIN - Abnormal;  Notable for the following components:    .5 (*)     All other components within normal limits   LACTATE DEHYDROGENASE - Abnormal; Notable for the following components:     (*)     All other components within normal limits   CK - Abnormal; Notable for the following components:     (*)     All other components within normal limits   PROCALCITONIN - Abnormal; Notable for the following components:    Procalcitonin 8.46 (*)     All other components within normal limits   SARS-COV-2 RNA AMPLIFICATION, QUAL - Abnormal; Notable for the following components:    SARS-CoV-2 RNA, Amplification, Qual Positive (*)     All other components within normal limits   URINALYSIS, REFLEX TO URINE CULTURE - Abnormal; Notable for the following components:    Appearance, UA Hazy (*)     Protein, UA 1+ (*)     Occult Blood UA 1+ (*)     All other components within normal limits    Narrative:     Preferred Collection Type->Urine, Clean Catch   CULTURE, BLOOD   CULTURE, BLOOD   FERRITIN   LACTIC ACID, PLASMA   TROPONIN I   URINALYSIS MICROSCOPIC    Narrative:     Preferred Collection Type->Urine, Clean Catch   IRON AND TIBC   FERRITIN   VITAMIN B12   FOLATE   LACTATE DEHYDROGENASE   HAPTOGLOBIN   RETICULOCYTES   TYPE & SCREEN   PREPARE RBC SOFT     EKG Readings: (Independently Interpreted)   Initial Reading: No STEMI. Previous EKG: Compared with most recent EKG Previous EKG Date: 1/16/2012. Rhythm: Sinus Bradycardia. Heart Rate: 55. Ectopy: PACs. ST Segments: Normal ST Segments. Clinical Impression: Sinus Bradycardia       Imaging Results          X-Ray Chest AP Portable (Final result)  Result time 04/05/20 21:54:11    Final result by Juan Best MD (04/05/20 21:54:11)                 Impression:      Bibasilar ground-glass airspace opacities.  The findings may be seen with viral or bacterial pneumonia.      Electronically signed by: Juan Best MD  Date:    04/05/2020  Time:    21:54             Narrative:     EXAMINATION:  XR CHEST AP PORTABLE    CLINICAL HISTORY:  Suspected Covid-19 Virus Infection;    TECHNIQUE:  Single frontal view of the chest was performed.    COMPARISON:  01/16/2012.    FINDINGS:  Monitoring EKG leads are present. The trachea is unremarkable. There are calcifications of the aortic knob.  The cardiomediastinal silhouette is at the upper limits of normal. The right hemidiaphragm is unremarkable.  There is elevation of left hemidiaphragm.  There is no evidence of free air beneath the hemidiaphragms. There is no evidence of a pneumothorax.  There is no evidence of pneumomediastinum.  There are bibasilar ground-glass airspace opacities.  There are degenerative changes in the osseous structures.                                 Medical Decision Making:   History:   Old Medical Records: I decided to obtain old medical records.  Old Records Summarized: other records.       <> Summary of Records: Patient presents with x-ray report that describes bibasilar infiltrates  Initial Assessment:   86-year-old male presenting from nursing facility for shortness of breath.  He is mildly hypoxic with O2 saturation down to 89% on room air improved to mid 90s on 4 liters/minute by nasal cannula.  His temperature is elevated 99.8 is afebrile.  Mildly tachypneic but able to speak in full sentences without difficulty.  Differential Diagnosis:   COVID-19 pneumonia  Bacterial pneumonia  COPD exacerbation  Low suspicion for influenza given no fever  Independently Interpreted Test(s):   I have ordered and independently interpreted X-rays - see summary below.       <> Summary of X-Ray Reading(s): Bibasilar ground-glass opacities  I have ordered and independently interpreted EKG Reading(s) - see prior notes  Clinical Tests:   Lab Tests: Ordered and Reviewed  Radiological Study: Ordered and Reviewed  Medical Tests: Ordered and Reviewed  ED Management:  Will check labs, do chest x-ray and reassess.    Chest x-ray shows bibasilar  pneumonia, COVID-19 positive.  Though pneumonia likely viral in nature, given significantly elevated procalcitonin will treat with ceftriaxone azithromycin given possibility of superinfection.  CBCs also notable for significant anemia with hemoglobin 6.1.  The patient does have anemia listed as a prior diagnosis but I do not have a baseline hemoglobin.  Guaiac is negative.  Will transfuse 1 unit.  Patient will be admitted to Hospital Medicine for further management.  Patient comfortable with admission.  I discussed this patient my supervising physician.                   ED Course as of Apr 05 2259   Sun Apr 05, 2020 2215 Chest x-ray shows bibasilar opacities.  Likely secondary to COVID-19, however will treat empirically with ceftriaxone azithromycin   X-Ray Chest AP Portable [CC]   2224 COVID positive   SARS-CoV-2 RNA, Rapid Amplification, Qual(!) [CC]   2249 Lactic Acid, Plasma [CC]   2258 Procalcitonin significantly elevated   Procalcitonin(!) [CC]      ED Course User Index  [CC] Keerthi Nascimento PA-C                Clinical Impression:       ICD-10-CM ICD-9-CM   1. Multifocal pneumonia J18.9 486   2. Suspected Covid-19 Virus Infection R68.89    3. COVID-19 virus infection U07.1    4. Anemia requiring transfusions D64.9 285.9         Disposition:   Disposition: Admitted  Condition: Fair                        Keerthi Nascimento PA-C  04/06/20 0009

## 2020-04-06 NOTE — ED TRIAGE NOTES
Arrived via EMS, POOR historian:  + Shortness of Breath (SOB, low o2 sat 89% on RA, hx of pneumonia)

## 2020-04-06 NOTE — ED NOTES
Attempted to call report, receiving floor req a 15 min. Call back as they have not assigned the patient as of yet.

## 2020-04-06 NOTE — H&P
Hospital Medicine  History and Physical  [facility]      Patient Name: Cedric Almaraz  MRN:  [id number]  Hospital Medicine Team: [facility] SORAIDA Greene MD  Date of Admission:  4/5/2020     Length of Stay:  LOS: 0 days     Principal Problem: Suspected Covid-19 Virus Infection    Chief complaint  dyspnea    HPI    Patient is a poor historian, thus history provided by chart review and provided by EMS by way of ED Physicians.     86 year old M with COPD, anemia, GERD presents to [facility] from [facility] for shortness of breath. Patient with paperwork from a clinic stating that he had a chest x-ray performed yesterday which showed bibasilar infiltrates after complaining of dyspnea, his O2 sat at that time was 94%. The patient reports cough and pain in both shoulders but denies any other complaints including fever, chills, chest pain, abdominal pain, and nausea/vomiting/diarrhea.    Review of Systems  Constitutional: Negative for fever, chills, fatigue, poor appetite   HENT: Negative for sore throat, negative for trouble swallowing.    Eyes: Negative for photophobia, visual disturbance.   Respiratory: Positive for shortness of breath, negative for cough  Cardiovascular: Negative for chest pain, palpitations, leg swelling.   Gastrointestinal: negative for diarrhea. Negative for abdominal pain, constipation, nausea, vomiting.   Endocrine: Negative for cold intolerance, heat intolerance.   Genitourinary: Negative for dysuria, frequency.   Musculoskeletal: Negative for arthralgias, myalgias.   Skin: Negative for rash  Neurological: Negative for dizziness, syncope, light-headedness.   Psychiatric/Behavioral: Negative for confusion, hallucinations, anxiety    Past Medical History:   Diagnosis Date    Anemia 4/5/2020    GERD (gastroesophageal reflux disease) 4/5/2020     History reviewed. No pertinent surgical history.  History reviewed. No pertinent family history.  Social  History     Socioeconomic History    Marital status: Unknown     Spouse name: Not on file    Number of children: Not on file    Years of education: Not on file    Highest education level: Not on file   Occupational History    Not on file   Social Needs    Financial resource strain: Not on file    Food insecurity:     Worry: Not on file     Inability: Not on file    Transportation needs:     Medical: Not on file     Non-medical: Not on file   Tobacco Use    Smoking status: Not on file   Substance and Sexual Activity    Alcohol use: Not on file    Drug use: Not on file    Sexual activity: Not on file   Lifestyle    Physical activity:     Days per week: Not on file     Minutes per session: Not on file    Stress: Not on file   Relationships    Social connections:     Talks on phone: Not on file     Gets together: Not on file     Attends Restorationist service: Not on file     Active member of club or organization: Not on file     Attends meetings of clubs or organizations: Not on file     Relationship status: Not on file   Other Topics Concern    Not on file   Social History Narrative    Not on file       Medications  No current facility-administered medications on file prior to encounter.      No current outpatient medications on file prior to encounter.       Allergies  Patient has no known allergies.    Physical Examination  Temp:  [99.8 °F (37.7 °C)]   Pulse:  [50-95]   Resp:  [19-28]   BP: ()/(54-59)   SpO2:  [90 %-95 %]     Gen: NAD, conversant  Head: NC, AT  Eyes: PERRLA, EOMI  Throat: MMM, OP clear  CV: RRR, no M/R/G, no peripheral edema, no JVD  Resp: coarse bilateral breath sounds, mild increased work of breathing on exam  GI: Soft, NT, ND, +BS  Ext: MAEW, no c/c/e  Neuro: AAOx3, CN grossly intact, no focal neurologic deficits  Psychiatry: Normal mood, normal affect    Laboratory:  Recent Labs   Lab 04/05/20 2127   WBC 8.35   LYMPH 8.4*  0.7*   HGB 6.1*   HCT 22.7*        Recent Labs    Lab 04/05/20 2127      K 4.2      CO2 24   BUN 23   CREATININE 1.5*      CALCIUM 8.2*     Recent Labs   Lab 04/05/20 2127   ALKPHOS 102   ALT 53*   *   ALBUMIN 2.6*   PROT 7.5   BILITOT 0.9      Recent Labs     04/05/20 2127   FERRITIN 81   .5*   *   TROPONINI 0.022   LACTATE 0.9       All labs within the last 24 hours were reviewed.     Microbiology:  Lab Results   Component Value Date    [id number] Positive (A) 04/05/2020       Microbiology Results (last 7 days)     Procedure Component Value Units Date/Time    Culture, Respiratory with Gram Stain [[id number]]     Order Status:  No result Specimen:  Sputum, Expectorated     Influenza A & B by Molecular [[id number]]     Order Status:  No result Specimen:  Nasopharyngeal Swab     Blood culture x two cultures. Draw prior to antibiotics. [[id number]] Collected:  04/05/20 2130    Order Status:  Sent Specimen:  Blood from Peripheral, Antecubital, Right Updated:  04/05/20 2152    Blood culture x two cultures. Draw prior to antibiotics. [[id number]] Collected:  04/05/20 2131    Order Status:  Sent Specimen:  Blood from Peripheral, Forearm, Right Updated:  04/05/20 2151            Imaging      No results found for this or any previous visit.    X-Ray Chest AP Portable  Narrative: EXAMINATION:  XR CHEST AP PORTABLE    CLINICAL HISTORY:  Suspected Covid-19 Virus Infection;    TECHNIQUE:  Single frontal view of the chest was performed.    COMPARISON:  01/16/2012.    FINDINGS:  Monitoring EKG leads are present. The trachea is unremarkable. There are calcifications of the aortic knob.  The cardiomediastinal silhouette is at the upper limits of normal. The right hemidiaphragm is unremarkable.  There is elevation of left hemidiaphragm.  There is no evidence of free air beneath the hemidiaphragms. There is no evidence of a pneumothorax.  There is no evidence of pneumomediastinum.  There are bibasilar ground-glass airspace opacities.  There  are degenerative changes in the osseous structures.  Impression: Bibasilar ground-glass airspace opacities.  The findings may be seen with viral or bacterial pneumonia.    Electronically signed by: Juan Best MD  Date:    04/05/2020  Time:    21:54      All imaging within the last 24 hours was reviewed.       Assessment and Plan:    Active Hospital Problems    Diagnosis  POA    COPD (chronic obstructive pulmonary disease) [J44.9]  Yes    Anemia [D64.9]  Yes    GERD (gastroesophageal reflux disease) [K21.9]  Yes    Multifocal pneumonia [J18.9]  Yes      Resolved Hospital Problems   No resolved problems to display.       Suspected Covid-19 Virus Infection  Person Under Investigation (PUI) for COVID-19  - COVID-19 testing:   - Infection Control notified    - Isolation:   - Airborne and Droplet Precautions  - Surgical mask on patient   - N95 masks must be fit tested, wear eye protection  - 20 second hand hygiene   - Limit visitors per hospital policy   - Consolidate lab draws, nursing care, and interventions    - Diagnostics: (Lymphopenia, hyponatremia, hyperferritinemia, elevated troponin, elevated d-dimer, age, and comorbidities are significant predictors of poor clinical outcome)   - CBC:   trend Q48hrs  - CMP:        trend Q48hrs  - Procalcitonin:  - D-dimer:  repeat prior to discharge  - Ferritin:  repeat prior to discharge   - CRP:        trend Q48hrs  - LDH:   repeat prior to discharge  - BNP:  - Troponin:    - ECG:   - rapid Flu:   - RIP only if BMT/solid transplant:   - Legionella antigen:   - Blood culture x2:   - Sputum culture:   - CXR:   - UA and culture:   - CPK:    - Management:   Bundle care as able to maintain isolation & minimize in/out of room   - Supplemental O2 to maintain SpO2 92%-96%   if requiring 6L NC or higher, place on nonrebreather and discuss case with MICU   - Telemetry & continuous pulse oximetry    - If wheezing   - albuterol inhaler 2-4 puff Q6hr PRN    - ipratropium daily    -  "acetaminophen 650mg PO Q6hr PRN fever   - loperamide PRN for viral diarrhea  - Empiric antibiotics per likely source & patient allergies    - CAP: x 5 day course (d/c early if low concern for bacterial co-infection)  Ceftriaxone 1g IV Q24hrs            Azithromycin 500mg IV day #1, then 250mg PO daily x4 days     If azithromycin is not available, start doxycycline                 If MRSA risk factors, add Vancomycin IV (PharmD consult)   - Investigational Treatment Protocol: (if patient meets criteria)   [email or web address]%[email or web address]%20Resources/[email or web address]_[email or web address]_Protocol.[email or web address]     - statin: atorvastatin 40mg po daily (if CPK WNL)    - start HCQ 400mg PO BID x1 day, then 400mg PO daily x 4 days   (check glucose 6 phosphate dehydrogenase (NOT G6PD Quant), ECG at start & 48hrs, and start Qshift POCT glucose)    Safety notes:   - Avoid NIPPV (CPAP/BiPAP) to prevent aerosolization, use on a case-by-case basis if in neg pressure room   - Cautious use of NSAIDS for fever per WHO recommendations (3/16/2020)   - No new ACEi/ARB start or discontinuation of chronic med unless hypotensive (Esler et al. Journal of Hypertension 2020, 38:000-000)   - Careful use of steroids in the absence of other indications (shock, ARDS)   - Fluid sparing resuscitation, avoid maintenance fluids     Anemia  - patient presents with microcytic anemia  - ED ordered 1U pRBCs  - repeat CBC  - iron studies and hemolysis labs ordered    Elevated Creatinine   - pt with Cr 1.5 on admission, unknown baseline (last Cr 1.2 in Epic)  - will obtain urine lytes and P:C ratio  - trend CMP q48      If patient transitions to Comfort-Focused Care, please place "Nurse Communication: End of Life Care, family members allowed to visit, including spouse/partner and adult children [please list names]. Please ask family to visit as a group and leave as a group.         VTE High Risk Prophylaxis: enoxaparin 40mg sq QHS @ 2100 " (bundled care) if GFR >30      H MD Ramona

## 2020-04-07 PROBLEM — U07.1 COVID-19 VIRUS INFECTION: Status: ACTIVE | Noted: 2020-04-07

## 2020-04-07 LAB
ALBUMIN SERPL BCP-MCNC: 2.5 G/DL (ref 3.5–5.2)
ALP SERPL-CCNC: 109 U/L (ref 55–135)
ALT SERPL W/O P-5'-P-CCNC: 53 U/L (ref 10–44)
ANION GAP SERPL CALC-SCNC: 11 MMOL/L (ref 8–16)
AST SERPL-CCNC: 75 U/L (ref 10–40)
BASOPHILS # BLD AUTO: 0.02 K/UL (ref 0–0.2)
BASOPHILS NFR BLD: 0.2 % (ref 0–1.9)
BILIRUB SERPL-MCNC: 0.8 MG/DL (ref 0.1–1)
BUN SERPL-MCNC: 15 MG/DL (ref 8–23)
CALCIUM SERPL-MCNC: 8.4 MG/DL (ref 8.7–10.5)
CHLORIDE SERPL-SCNC: 104 MMOL/L (ref 95–110)
CO2 SERPL-SCNC: 24 MMOL/L (ref 23–29)
CREAT SERPL-MCNC: 1.1 MG/DL (ref 0.5–1.4)
CRP SERPL-MCNC: 180.8 MG/L (ref 0–8.2)
DIFFERENTIAL METHOD: ABNORMAL
EOSINOPHIL # BLD AUTO: 0 K/UL (ref 0–0.5)
EOSINOPHIL NFR BLD: 0.4 % (ref 0–8)
ERYTHROCYTE [DISTWIDTH] IN BLOOD BY AUTOMATED COUNT: 26.4 % (ref 11.5–14.5)
EST. GFR  (AFRICAN AMERICAN): >60 ML/MIN/1.73 M^2
EST. GFR  (NON AFRICAN AMERICAN): >60 ML/MIN/1.73 M^2
GLUCOSE SERPL-MCNC: 89 MG/DL (ref 70–110)
HCT VFR BLD AUTO: 30.8 % (ref 40–54)
HGB BLD-MCNC: 8.7 G/DL (ref 14–18)
IMM GRANULOCYTES # BLD AUTO: 0.27 K/UL (ref 0–0.04)
IMM GRANULOCYTES NFR BLD AUTO: 2.9 % (ref 0–0.5)
LYMPHOCYTES # BLD AUTO: 0.8 K/UL (ref 1–4.8)
LYMPHOCYTES NFR BLD: 8.9 % (ref 18–48)
MAGNESIUM SERPL-MCNC: 2.5 MG/DL (ref 1.6–2.6)
MCH RBC QN AUTO: 20.9 PG (ref 27–31)
MCHC RBC AUTO-ENTMCNC: 28.2 G/DL (ref 32–36)
MCV RBC AUTO: 74 FL (ref 82–98)
MONOCYTES # BLD AUTO: 0.5 K/UL (ref 0.3–1)
MONOCYTES NFR BLD: 5.5 % (ref 4–15)
NEUTROPHILS # BLD AUTO: 7.6 K/UL (ref 1.8–7.7)
NEUTROPHILS NFR BLD: 82.1 % (ref 38–73)
NRBC BLD-RTO: 1 /100 WBC
PHOSPHATE SERPL-MCNC: 2.6 MG/DL (ref 2.7–4.5)
PLATELET # BLD AUTO: 313 K/UL (ref 150–350)
PMV BLD AUTO: ABNORMAL FL (ref 9.2–12.9)
POCT GLUCOSE: 100 MG/DL (ref 70–110)
POCT GLUCOSE: 121 MG/DL (ref 70–110)
POCT GLUCOSE: 92 MG/DL (ref 70–110)
POTASSIUM SERPL-SCNC: 3.9 MMOL/L (ref 3.5–5.1)
PROT SERPL-MCNC: 7.9 G/DL (ref 6–8.4)
RBC # BLD AUTO: 4.17 M/UL (ref 4.6–6.2)
SODIUM SERPL-SCNC: 139 MMOL/L (ref 136–145)
WBC # BLD AUTO: 9.24 K/UL (ref 3.9–12.7)

## 2020-04-07 PROCEDURE — 36415 COLL VENOUS BLD VENIPUNCTURE: CPT

## 2020-04-07 PROCEDURE — 99233 SBSQ HOSP IP/OBS HIGH 50: CPT | Mod: ,,, | Performed by: FAMILY MEDICINE

## 2020-04-07 PROCEDURE — 27100098 HC SPACER

## 2020-04-07 PROCEDURE — 25000242 PHARM REV CODE 250 ALT 637 W/ HCPCS: Performed by: FAMILY MEDICINE

## 2020-04-07 PROCEDURE — 80053 COMPREHEN METABOLIC PANEL: CPT

## 2020-04-07 PROCEDURE — 84100 ASSAY OF PHOSPHORUS: CPT

## 2020-04-07 PROCEDURE — 85025 COMPLETE CBC W/AUTO DIFF WBC: CPT

## 2020-04-07 PROCEDURE — 63700000 PHARM REV CODE 250 ALT 637 W/O HCPCS: Performed by: FAMILY MEDICINE

## 2020-04-07 PROCEDURE — 86140 C-REACTIVE PROTEIN: CPT

## 2020-04-07 PROCEDURE — 11000001 HC ACUTE MED/SURG PRIVATE ROOM

## 2020-04-07 PROCEDURE — 94761 N-INVAS EAR/PLS OXIMETRY MLT: CPT

## 2020-04-07 PROCEDURE — 63600175 PHARM REV CODE 636 W HCPCS: Performed by: FAMILY MEDICINE

## 2020-04-07 PROCEDURE — 94640 AIRWAY INHALATION TREATMENT: CPT

## 2020-04-07 PROCEDURE — 27000221 HC OXYGEN, UP TO 24 HOURS

## 2020-04-07 PROCEDURE — 83735 ASSAY OF MAGNESIUM: CPT

## 2020-04-07 PROCEDURE — 87040 BLOOD CULTURE FOR BACTERIA: CPT | Mod: 59

## 2020-04-07 PROCEDURE — 99233 PR SUBSEQUENT HOSPITAL CARE,LEVL III: ICD-10-PCS | Mod: ,,, | Performed by: FAMILY MEDICINE

## 2020-04-07 RX ORDER — ENOXAPARIN SODIUM 100 MG/ML
40 INJECTION SUBCUTANEOUS NIGHTLY
Status: DISCONTINUED | OUTPATIENT
Start: 2020-04-07 | End: 2020-04-10 | Stop reason: HOSPADM

## 2020-04-07 RX ORDER — ALBUTEROL SULFATE 90 UG/1
2 AEROSOL, METERED RESPIRATORY (INHALATION)
Status: DISCONTINUED | OUTPATIENT
Start: 2020-04-07 | End: 2020-04-09

## 2020-04-07 RX ADMIN — VANCOMYCIN HYDROCHLORIDE 750 MG: 750 INJECTION, POWDER, LYOPHILIZED, FOR SOLUTION INTRAVENOUS at 09:04

## 2020-04-07 RX ADMIN — ALBUTEROL SULFATE 2 PUFF: 90 AEROSOL, METERED RESPIRATORY (INHALATION) at 08:04

## 2020-04-07 RX ADMIN — ENOXAPARIN SODIUM 40 MG: 100 INJECTION SUBCUTANEOUS at 09:04

## 2020-04-07 RX ADMIN — ALBUTEROL SULFATE 2 PUFF: 90 AEROSOL, METERED RESPIRATORY (INHALATION) at 02:04

## 2020-04-07 RX ADMIN — CEFTRIAXONE SODIUM 1 G: 1 INJECTION, POWDER, FOR SOLUTION INTRAMUSCULAR; INTRAVENOUS at 08:04

## 2020-04-07 RX ADMIN — FLUTICASONE FUROATE AND VILANTEROL TRIFENATATE 1 PUFF: 200; 25 POWDER RESPIRATORY (INHALATION) at 07:04

## 2020-04-07 RX ADMIN — ALBUTEROL SULFATE 2 PUFF: 90 AEROSOL, METERED RESPIRATORY (INHALATION) at 07:04

## 2020-04-07 RX ADMIN — IPRATROPIUM BROMIDE 2 PUFF: 17 AEROSOL, METERED RESPIRATORY (INHALATION) at 08:04

## 2020-04-07 RX ADMIN — AZITHROMYCIN MONOHYDRATE 250 MG: 250 TABLET ORAL at 08:04

## 2020-04-07 RX ADMIN — IPRATROPIUM BROMIDE 2 PUFF: 17 AEROSOL, METERED RESPIRATORY (INHALATION) at 07:04

## 2020-04-07 RX ADMIN — IPRATROPIUM BROMIDE 2 PUFF: 17 AEROSOL, METERED RESPIRATORY (INHALATION) at 02:04

## 2020-04-07 NOTE — PLAN OF CARE
Addendum: 4-8-20 156pm    SW experienced technical issues this morning and afternoon causing inability to send electronic refs in [employer], issues were communicated to leadership team. MARIA ELENA also sent email correspondence in [employer] to tech team.  MARIA ELENA was able this afternoon to send ref by email to [facility]. MARIA ELENA will continue to follow for DC updates.     MARIA ELENA communicated to CM to relay message in secure chat that all prior facilties refs needed to be contacted this afternoon by a member of leadership team in efforts of expediting ref status of pt.     Lenka Velasco LMSW   Licensed Master Social Worker       MARIA ELENA called [facility] to inquire if pt can return to facility as , he has tested positive for COVID19. MARIA ELENA left message with [facility] front office staff for admissions to return call to me.     MARIA ELENA will send refs to local facilities accepting COVID19 pts in the event, pt is not allowed to return. CM updated.      Lenka Velasco LMSW   Licensed Master Social Worker

## 2020-04-07 NOTE — PROGRESS NOTES
Hospital Medicine  Progress Note  [facility]      Patient Name: Cedric Almaraz  MRN:  [id number]  Hospital Medicine Team: [facility] MED J Malachi Terrazas MD  Date of Admission:  4/5/2020     Length of Stay:  LOS: 2 days       Principal Problem:  COVID-19 virus infection      HPI:  86yoM PMHx COPD, anemia, GERD presents to [facility] from [facility] for SOB. Patient with paperwork from a clinic stating that he had a chest x-ray performed day prior to admission which showed bibasilar infiltrates after complaining of dyspnea, his O2 sat at that time was 94%. The patient reports cough and pain in both shoulders but denies any other complaints including fever, chills, chest pain, abdominal pain, and nausea/vomiting/diarrhea.    Hospital Course:  Patient admitted for evaluation of possible COVID-19. Positive.    Interval History:     4/7: AAOx3. Pt on 3L NC mid 90s sats. Afebrile. Relatively comfortable during visit today.    Review of Systems:  Respiratory: sob  Cardiovascular: no cp  GI: no diarrhea    Inpatient Medications:    Current Facility-Administered Medications:     0.9%  NaCl infusion (for blood administration), , Intravenous, Q24H PRN, Keerthi Nascimento PA-C    albuterol inhaler 2 puff, 2 puff, Inhalation, Q6H WAKE, 2 puff at 04/07/20 1451 **AND** MDI Q6H WAKE, , , Q6H WAKE, Malachi Terrazas MD    azithromycin tablet 250 mg, 250 mg, Oral, Q24H, Malachi Terrazas MD, 250 mg at 04/07/20 0845    cefTRIAXone injection 1 g, 1 g, Intravenous, Q24H, Malachi Terrazas MD, 1 g at 04/07/20 0845    dextrose 10% (D10W) Bolus, 12.5 g, Intravenous, PRN, Malachi Terrazas MD    dextrose 10% (D10W) Bolus, 25 g, Intravenous, PRN, Malachi Terrazas MD    enoxaparin injection 40 mg, 40 mg, Subcutaneous, QHS, Malachi Terrazas MD    fluticasone furoate-vilanteroL 200-25 mcg/dose diskus inhaler 1 puff, 1 puff, Inhalation, Daily, Malachi Terrazas MD, 1 puff at 04/07/20 0740    glucagon  "(human recombinant) injection 1 mg, 1 mg, Intramuscular, PRN, Malachi Terrazas MD    glucose chewable tablet 16 g, 16 g, Oral, PRN, Malachi Terrazas MD    glucose chewable tablet 24 g, 24 g, Oral, PRN, Malachi Terrazas MD    ipratropium inhaler 2 puff, 2 puff, Inhalation, Q6H WAKE, 2 puff at 04/07/20 1451 **AND** MDI Q6H PRN, , , Q6H PRN, Malachi Terrazas MD    sodium chloride 0.9% flush 10 mL, 10 mL, Intravenous, PRN, Malachi Terrazas MD    vancomycin 750 mg in dextrose 5 % 250 mL IVPB (ready to mix system), 750 mg, Intravenous, Q24H, Malachi Terrazas MD, Last Rate: 250 mL/hr at 04/06/20 2300, 750 mg at 04/06/20 2300      Physical Exam:      Intake/Output Summary (Last 24 hours) at 4/7/2020 1646  Last data filed at 4/7/2020 1400  Gross per 24 hour   Intake 360 ml   Output --   Net 360 ml     Wt Readings from Last 3 Encounters:   04/06/20 61.2 kg (134 lb 14.7 oz)       /77   Pulse 65   Temp 98.1 °F (36.7 °C)   Resp 17   Ht 5' 5" (1.651 m)   Wt 61.2 kg (134 lb 14.7 oz)   SpO2 (!) 94%   BMI 22.45 kg/m²     GEN: NAD, conversant  Resp: coarse bilateral breath sounds, normal wob on nc  CV: no edema  Skin: no rash    Laboratory:  Lab Results   Component Value Date    [id number] Positive (A) 04/05/2020       Recent Labs   Lab 04/05/20 2127 04/06/20  0717 04/07/20  0632   WBC 8.35 10.87 9.24   LYMPH 8.4*  0.7* 7.0*  0.8* 8.9*  0.8*   HGB 6.1* 8.4* 8.7*   HCT 22.7* 29.9* 30.8*    273 313     Recent Labs   Lab 04/05/20 2127 04/07/20  0632    139   K 4.2 3.9    104   CO2 24 24   BUN 23 15   CREATININE 1.5* 1.1    89   CALCIUM 8.2* 8.4*   MG  --  2.5   PHOS  --  2.6*     Recent Labs   Lab 04/05/20 2127 04/07/20  0632   ALKPHOS 102 109   ALT 53* 53*   * 75*   ALBUMIN 2.6* 2.5*   PROT 7.5 7.9   BILITOT 0.9 0.8        Recent Labs     04/05/20 2127 04/07/20  0632   DDIMER 0.91*  --    FERRITIN 81  --    .5* 180.8*   *  --    BNP 48  --    TROPONINI 0.022  --  "   *  --        All labs within the last 24 hours were reviewed.     Microbiology:  Microbiology Results (last 7 days)     Procedure Component Value Units Date/Time    Blood culture [[id number]] Collected:  04/07/20 0638    Order Status:  Completed Specimen:  Blood Updated:  04/07/20 1515     Blood Culture, Routine No Growth to date    Blood culture [[id number]] Collected:  04/07/20 0633    Order Status:  Completed Specimen:  Blood Updated:  04/07/20 1515     Blood Culture, Routine No Growth to date    Blood culture x two cultures. Draw prior to antibiotics. [[id number]] Collected:  04/05/20 2131    Order Status:  Completed Specimen:  Blood from Peripheral, Forearm, Right Updated:  04/07/20 0830     Blood Culture, Routine Gram stain april bottle: Gram positive cocci in clusters resembling Staph       Results called to and read back by: Heather Gusman RN  04/06/2020  21:29      Gram stain aer bottle: Gram positive cocci in clusters resembling Staph       Positive results previously called 04/07/2020  00:05    Narrative:       Aerobic and anaerobic    Blood culture x two cultures. Draw prior to antibiotics. [[id number]] Collected:  04/05/20 2130    Order Status:  Completed Specimen:  Blood from Peripheral, Antecubital, Right Updated:  04/07/20 0812     Blood Culture, Routine Gram stain april bottle: Gram positive cocci in clusters resembling Staph       Results called to and read back by: Heather Gusman RN  04/07/2020  00:49      Gram stain aer bottle: Gram positive cocci in clusters resembling Staph       04/07/2020  08:12    Narrative:       Aerobic and anaerobic    Influenza A & B by Molecular [[id number]] Collected:  04/06/20 0011    Order Status:  Completed Specimen:  Nasopharyngeal Swab Updated:  04/06/20 0046     Influenza A, Molecular Negative     Influenza B, Molecular Negative     Flu A & B Source NP    Culture, Respiratory with Gram Stain [[id number]]     Order Status:  No result Specimen:  Sputum, Expectorated              Imaging  ECG Results          EKG 12-lead (Final result)  Result time 04/06/20 10:03:38    Final result by Interface, Lab In [facility] (04/06/20 10:03:38)                 Narrative:    Test Reason : R68.89,    Vent. Rate : 055 BPM     Atrial Rate : 055 BPM     P-R Int : 132 ms          QRS Dur : 078 ms      QT Int : 482 ms       P-R-T Axes : 065 041 064 degrees     QTc Int : 461 ms    Sinus bradycardia with Premature atrial complexes  Otherwise normal ECG  When compared with ECG of 16-JAN-2012 12:33,  Premature atrial complexes are now Present  Confirmed by ANTONI MAN MD ([id number]) on 4/6/2020 10:03:30 AM    Referred By: [email or web address]   SELF           Confirmed By:ANTONI MAN MD                              No results found for this or any previous visit.    X-Ray Chest AP Portable  Narrative: EXAMINATION:  XR CHEST AP PORTABLE    CLINICAL HISTORY:  Suspected Covid-19 Virus Infection;    TECHNIQUE:  Single frontal view of the chest was performed.    COMPARISON:  01/16/2012.    FINDINGS:  Monitoring EKG leads are present. The trachea is unremarkable. There are calcifications of the aortic knob.  The cardiomediastinal silhouette is at the upper limits of normal. The right hemidiaphragm is unremarkable.  There is elevation of left hemidiaphragm.  There is no evidence of free air beneath the hemidiaphragms. There is no evidence of a pneumothorax.  There is no evidence of pneumomediastinum.  There are bibasilar ground-glass airspace opacities.  There are degenerative changes in the osseous structures.  Impression: Bibasilar ground-glass airspace opacities.  The findings may be seen with viral or bacterial pneumonia.    Electronically signed by: Juan Best MD  Date:    04/05/2020  Time:    21:54      All imaging within the last 24 hours was reviewed.     Assessment and Plan:    Active Hospital Problems    Diagnosis  POA    *COVID-19 virus infection [U07.1]  Yes    COPD (chronic obstructive pulmonary disease)  [J44.9]  Yes    Anemia [D64.9]  Yes    GERD (gastroesophageal reflux disease) [K21.9]  Yes    Multifocal pneumonia [J18.9]  Yes      Resolved Hospital Problems   No resolved problems to display.       Covid-19 Virus Infection  - COVID-19 testing: positive  - Infection Control notified    - Isolation:   - Airborne and Droplet Precautions  - Surgical mask on patient   - N95 masks must be fit tested, wear eye protection  - 20 second hand hygiene   - Limit visitors per hospital policy   - Consolidate lab draws, nursing care, and interventions    - Diagnostics: (Lymphopenia, hyponatremia, hyperferritinemia, elevated troponin, elevated d-dimer, age, and comorbidities are significant predictors of poor clinical outcome)   - CBC: Hgb 8.7 (8.4); Lymph 0.8  trend Q48hrs  - CMP:   Cr 1.1 (1.5); elev LFTs     trend Q48hrs  - Procalcitonin:  - D-dimer:0.91  repeat prior to discharge  - Ferritin:81  repeat prior to discharge   - CRP:    180 (191)    trend Q48hrs  - LDH: 436  repeat prior to discharge  - BNP:48  - Troponin:wnl    - ECG:qtc 461   - rapid Flu:neg   - RIP only if BMT/solid transplant:   - Legionella antigen:   - Blood culture x2:repeat cxs   - Sputum culture:   - CXR:as above   - UA and culture:   - CPK:544    - Management:   Bundle care as able to maintain isolation & minimize in/out of room   - Supplemental O2 to maintain SpO2 92%-96%   if requiring 6L NC or higher, place on nonrebreather and discuss case with MICU   - Telemetry & continuous pulse oximetry    - If wheezing   - albuterol inhaler 2-4 puff Q6hr PRN    - ipratropium daily    - acetaminophen 650mg PO Q6hr PRN fever   - loperamide PRN for viral diarrhea  - Empiric antibiotics per likely source & patient allergies    - CAP: x 5 day course (d/c early if low concern for bacterial co-infection)  Ceftriaxone 1g IV Q24hrs            Azithromycin 500mg IV day #1, then 250mg PO daily x4 days     If azithromycin is not available, start doxycycline               "   If MRSA risk factors, add Vancomycin IV (PharmD consult)     - Investigational Treatment Protocol: (if patient meets criteria)   [email or web address]     - statin: atorvastatin 40mg po daily (if CPK WNL)    - start HCQ 400mg PO BID x1 day, then 400mg PO daily x 4 days   (check glucose 6 phosphate dehydrogenase (NOT G6PD Quant), ECG on start & @48hrs, and start Qshift POCT glucose)    Safety notes:   - Avoid NIPPV (CPAP/BiPAP) to prevent aerosolization, use on a case-by-case basis if in neg pressure room   - Cautious use of NSAIDS for fever per WHO recommendations (3/16/2020)   - No new ACEi/ARB start or discontinuation of chronic med unless hypotensive (Esler et al. Journal of Hypertension 2020, 38:000-000)   - Careful use of steroids in the absence of other indications (shock, ARDS)   - Fluid sparing resuscitation, avoid maintenance fluids       Acute respiratory failure and COVID +  On empiric CAP therapy.  supp O2 prn.    Advance Care Planning  Goals of care, counseling/discussion   full  Advance Care Planning      Bacteremia - initial blood cultures  - repeating cultures  - initial cultures showing consistency with staph. vanc pharm and dose.    Anemia  - patient presents with microcytic anemia  - ED ordered 1U pRBCs --> Hgb recovered to 8.4, cont to monitor.     Elevated Creatinine   - pt with Cr 1.5 on admission, unknown baseline (last Cr 1.2 in Epic)  - trend CMP q48    If patient transitions to Comfort-Focused Care, please place "Nurse Communication: End of Life Care, family members allowed to visit, including spouse/partner and adult children [please list names]. Please ask family to visit as a group and leave as a group.       VTE High Risk Prophylaxis: enoxaparin 40mg sq QHS @ 2100 (bundled care) if GFR >30    Patient's chronic/stable medical conditions noted in the problem list above will be managed with " the patient's home medications as tolerated.       Level 3 91401 Total visit time was 35 minutes or greater with greater than 50% of time spent in counseling and coordination of care.       Malachi Terrazas MD  Spectralink: [id number]  Pager: [phone]

## 2020-04-07 NOTE — PLAN OF CARE
Problem: Fall Injury Risk  Goal: Absence of Fall and Fall-Related Injury  Outcome: Ongoing, Progressing     Problem: Adult Inpatient Plan of Care  Goal: Plan of Care Review  Outcome: Ongoing, Progressing  Goal: Patient-Specific Goal (Individualization)  Outcome: Ongoing, Progressing  Goal: Absence of Hospital-Acquired Illness or Injury  Outcome: Ongoing, Progressing  Goal: Optimal Comfort and Wellbeing  Outcome: Ongoing, Progressing  Goal: Readiness for Transition of Care  Outcome: Ongoing, Progressing  Goal: Rounds/Family Conference  Outcome: Ongoing, Progressing     Problem: Fluid Imbalance (Pneumonia)  Goal: Fluid Balance  Outcome: Ongoing, Progressing     Problem: Infection (Pneumonia)  Goal: Resolution of Infection Signs/Symptoms  Outcome: Ongoing, Progressing     Problem: Respiratory Compromise (Pneumonia)  Goal: Effective Oxygenation and Ventilation  Outcome: Ongoing, Progressing     Problem: Skin Injury Risk Increased  Goal: Skin Health and Integrity  Outcome: Ongoing, Progressing     Patient alert and cooperative. No complaints of pain noted. Medicated as ordered.

## 2020-04-07 NOTE — PROGRESS NOTES
Pharmacokinetic Initial Assessment: IV Vancomycin    Assessment/Plan:    · COVID-19 positive, BCx with gram pos cocci resembling staph detected  · Initiate intravenous vancomycin with a maintenance dose of vancomycin 750 mg IV every 24 hours  · Draw vancomycin level 24 hours after administration of the first dose to ensure drug clearance  · Desired empiric serum trough concentration is 15 to 20 mcg/mL  · Draw vancomycin trough level 30 min prior to third dose on 4/8 at approximately 2230   · Pharmacy will continue to follow and monitor vancomycin.      Please contact pharmacy at extension [phone] with any questions regarding this assessment.     Thank you for the consult,   Dakota Kraft       Patient brief summary:  Cedric Almaraz is a 86 y.o. male initiated on antimicrobial therapy with IV Vancomycin for treatment of suspected bacteremia    Drug Allergies:   Review of patient's allergies indicates:  No Known Allergies    Actual Body Weight:   61.2 kg    Renal Function:   Estimated Creatinine Clearance: 30.6 mL/min (A) (based on SCr of 1.5 mg/dL (H)).,     Dialysis Method (if applicable):  N/A    CBC (last 72 hours):  Recent Labs   Lab Result Units 04/05/20 2127 04/06/20  0717   WBC K/uL 8.35 10.87   Hemoglobin g/dL 6.1* 8.4*   Hematocrit % 22.7* 29.9*   Platelets K/uL 253 273   Gran% % 83.6* 85.8*   Lymph% % 8.4* 7.0*   Mono% % 5.5 4.3   Eosinophil% % 0.5 0.3   Basophil% % 0.1 0.2   Differential Method  Automated Automated       Metabolic Panel (last 72 hours):  Recent Labs   Lab Result Units 04/05/20 2127 04/05/20  2146   Sodium mmol/L 138  --    Sodium Urine Random mmol/L  --  <20*   Potassium mmol/L 4.2  --    Chloride mmol/L 104  --    CO2 mmol/L 24  --    Glucose mg/dL 109  --    Glucose, UA   --  Negative   BUN, Bld mg/dL 23  --    Creatinine mg/dL 1.5*  --    Creatinine, Random Ur mg/dL  --  117.0   Albumin g/dL 2.6*  --    Total Bilirubin mg/dL 0.9  --    Alkaline Phosphatase U/L 102  --    AST U/L 101*   --    ALT U/L 53*  --        Drug levels (last 3 results):  No results for input(s): VANCOMYCINRA, VANCOMYCINPE, VANCOMYCINTR in the last 72 hours.    Microbiologic Results:  Microbiology Results (last 7 days)     Procedure Component Value Units Date/Time    Blood culture x two cultures. Draw prior to antibiotics. [[id number]] Collected:  04/05/20 2131    Order Status:  Completed Specimen:  Blood from Peripheral, Forearm, Right Updated:  04/06/20 2129     Blood Culture, Routine Gram stain april bottle: Gram positive cocci in clusters resembling Staph       Results called to and read back by: Heather Gusman RN  04/06/2020  21:29    Narrative:       Aerobic and anaerobic    Blood culture x two cultures. Draw prior to antibiotics. [[id number]] Collected:  04/05/20 2130    Order Status:  Completed Specimen:  Blood from Peripheral, Antecubital, Right Updated:  04/06/20 0515     Blood Culture, Routine No Growth to date    Narrative:       Aerobic and anaerobic    Influenza A & B by Molecular [[id number]] Collected:  04/06/20 0011    Order Status:  Completed Specimen:  Nasopharyngeal Swab Updated:  04/06/20 0046     Influenza A, Molecular Negative     Influenza B, Molecular Negative     Flu A & B Source NP    Culture, Respiratory with Gram Stain [[id number]]     Order Status:  No result Specimen:  Sputum, Expectorated

## 2020-04-08 LAB
BACTERIA BLD CULT: ABNORMAL
L PNEUMO AG UR QL IA: NOT DETECTED
POCT GLUCOSE: 87 MG/DL (ref 70–110)
VANCOMYCIN SERPL-MCNC: 9.9 UG/ML

## 2020-04-08 PROCEDURE — 99233 PR SUBSEQUENT HOSPITAL CARE,LEVL III: ICD-10-PCS | Mod: ,,, | Performed by: FAMILY MEDICINE

## 2020-04-08 PROCEDURE — 94640 AIRWAY INHALATION TREATMENT: CPT

## 2020-04-08 PROCEDURE — 80202 ASSAY OF VANCOMYCIN: CPT

## 2020-04-08 PROCEDURE — 36415 COLL VENOUS BLD VENIPUNCTURE: CPT

## 2020-04-08 PROCEDURE — 99900035 HC TECH TIME PER 15 MIN (STAT)

## 2020-04-08 PROCEDURE — 94761 N-INVAS EAR/PLS OXIMETRY MLT: CPT

## 2020-04-08 PROCEDURE — 99233 SBSQ HOSP IP/OBS HIGH 50: CPT | Mod: ,,, | Performed by: FAMILY MEDICINE

## 2020-04-08 PROCEDURE — 11000001 HC ACUTE MED/SURG PRIVATE ROOM

## 2020-04-08 PROCEDURE — 63600175 PHARM REV CODE 636 W HCPCS: Performed by: FAMILY MEDICINE

## 2020-04-08 PROCEDURE — 63700000 PHARM REV CODE 250 ALT 637 W/O HCPCS: Performed by: FAMILY MEDICINE

## 2020-04-08 PROCEDURE — 27000221 HC OXYGEN, UP TO 24 HOURS

## 2020-04-08 RX ADMIN — VANCOMYCIN HYDROCHLORIDE 750 MG: 750 INJECTION, POWDER, LYOPHILIZED, FOR SOLUTION INTRAVENOUS at 09:04

## 2020-04-08 RX ADMIN — CEFTRIAXONE SODIUM 1 G: 1 INJECTION, POWDER, FOR SOLUTION INTRAMUSCULAR; INTRAVENOUS at 08:04

## 2020-04-08 RX ADMIN — IPRATROPIUM BROMIDE 2 PUFF: 17 AEROSOL, METERED RESPIRATORY (INHALATION) at 01:04

## 2020-04-08 RX ADMIN — FLUTICASONE FUROATE AND VILANTEROL TRIFENATATE 1 PUFF: 200; 25 POWDER RESPIRATORY (INHALATION) at 08:04

## 2020-04-08 RX ADMIN — ENOXAPARIN SODIUM 40 MG: 100 INJECTION SUBCUTANEOUS at 09:04

## 2020-04-08 RX ADMIN — AZITHROMYCIN MONOHYDRATE 250 MG: 250 TABLET ORAL at 08:04

## 2020-04-08 RX ADMIN — ALBUTEROL SULFATE 2 PUFF: 90 AEROSOL, METERED RESPIRATORY (INHALATION) at 07:04

## 2020-04-08 RX ADMIN — IPRATROPIUM BROMIDE 2 PUFF: 17 AEROSOL, METERED RESPIRATORY (INHALATION) at 07:04

## 2020-04-08 RX ADMIN — ALBUTEROL SULFATE 2 PUFF: 90 AEROSOL, METERED RESPIRATORY (INHALATION) at 01:04

## 2020-04-08 NOTE — PLAN OF CARE
Problem: Fall Injury Risk  Goal: Absence of Fall and Fall-Related Injury  Outcome: Ongoing, Progressing     Problem: Adult Inpatient Plan of Care  Goal: Plan of Care Review  Outcome: Ongoing, Progressing  Goal: Patient-Specific Goal (Individualization)  Outcome: Ongoing, Progressing  Goal: Absence of Hospital-Acquired Illness or Injury  Outcome: Ongoing, Progressing  Goal: Optimal Comfort and Wellbeing  Outcome: Ongoing, Progressing  Goal: Readiness for Transition of Care  Outcome: Ongoing, Progressing  Goal: Rounds/Family Conference  Outcome: Ongoing, Progressing     Problem: Fluid Imbalance (Pneumonia)  Goal: Fluid Balance  Outcome: Ongoing, Progressing     Problem: Infection (Pneumonia)  Goal: Resolution of Infection Signs/Symptoms  Outcome: Ongoing, Progressing     Problem: Respiratory Compromise (Pneumonia)  Goal: Effective Oxygenation and Ventilation  Outcome: Ongoing, Progressing     Problem: Skin Injury Risk Increased  Goal: Skin Health and Integrity  Outcome: Ongoing, Progressing     Patient alert and cooperative this shift. Repositioned as needed. Encouraged fluids and meals. Will continue to monitor

## 2020-04-08 NOTE — PROGRESS NOTES
Pharmacokinetic Assessment Follow Up: IV Vancomycin    Vancomycin serum concentration assessment(s):  · Random level of 9.9 mcg/mL was drawn inappropriately ~8.5 hours after the dose  · However, level is subtherapeutic for target of 15-20 mcg/mL   · Scr trending down    Vancomycin Regimen Plan:  1. Will adjust regimen to vancomycin 750mg q12 hours  2. Repeat trough prior to 4th dose of new regimen on 4/9 at 2030    Drug levels (last 3 results):  Recent Labs   Lab Result Units 04/08/20  0622   Vancomycin, Random ug/mL 9.9       Pharmacy will continue to follow and monitor vancomycin.    Please contact pharmacy at extension [phone] for questions regarding this assessment.    Thank you for the consult,   Marielena Cat       Patient brief summary:  Cedric Almaraz is a 86 y.o. male initiated on antimicrobial therapy with IV Vancomycin for treatment of bacteremia    The patient's current regimen is 750mg q24 hours    Drug Allergies:   Review of patient's allergies indicates:  No Known Allergies    Actual Body Weight:   61.2 kg    Renal Function:   Estimated Creatinine Clearance: 41.7 mL/min (based on SCr of 1.1 mg/dL).,       CBC (last 72 hours):  Recent Labs   Lab Result Units 04/05/20 2127 04/06/20  0717 04/07/20  0632   WBC K/uL 8.35 10.87 9.24   Hemoglobin g/dL 6.1* 8.4* 8.7*   Hematocrit % 22.7* 29.9* 30.8*   Platelets K/uL 253 273 313   Gran% % 83.6* 85.8* 82.1*   Lymph% % 8.4* 7.0* 8.9*   Mono% % 5.5 4.3 5.5   Eosinophil% % 0.5 0.3 0.4   Basophil% % 0.1 0.2 0.2   Differential Method  Automated Automated Automated       Metabolic Panel (last 72 hours):  Recent Labs   Lab Result Units 04/05/20 2127 04/05/20  2146 04/07/20  0632   Sodium mmol/L 138  --  139   Sodium Urine Random mmol/L  --  <20*  --    Potassium mmol/L 4.2  --  3.9   Chloride mmol/L 104  --  104   CO2 mmol/L 24  --  24   Glucose mg/dL 109  --  89   Glucose, UA   --  Negative  --    BUN, Bld mg/dL 23  --  15   Creatinine mg/dL 1.5*  --  1.1    Creatinine, Random Ur mg/dL  --  117.0  --    Albumin g/dL 2.6*  --  2.5*   Total Bilirubin mg/dL 0.9  --  0.8   Alkaline Phosphatase U/L 102  --  109   AST U/L 101*  --  75*   ALT U/L 53*  --  53*   Magnesium mg/dL  --   --  2.5   Phosphorus mg/dL  --   --  2.6*       Vancomycin Administrations:  vancomycin given in the last 96 hours                   vancomycin 750 mg in dextrose 5 % 250 mL IVPB (ready to mix system) (mg) 750 mg New Bag 04/07/20 2152     750 mg New Bag 04/06/20 2300                Microbiologic Results:  Microbiology Results (last 7 days)     Procedure Component Value Units Date/Time    Blood culture [[id number]] Collected:  04/07/20 0638    Order Status:  Completed Specimen:  Blood Updated:  04/08/20 0812     Blood Culture, Routine No Growth to date      No Growth to date    Blood culture [[id number]] Collected:  04/07/20 0633    Order Status:  Completed Specimen:  Blood Updated:  04/08/20 0812     Blood Culture, Routine No Growth to date      No Growth to date    Blood culture x two cultures. Draw prior to antibiotics. [[id number]] Collected:  04/05/20 2130    Order Status:  Completed Specimen:  Blood from Peripheral, Antecubital, Right Updated:  04/08/20 0756     Blood Culture, Routine Gram stain april bottle: Gram positive cocci in clusters resembling Staph       Results called to and read back by: Heather Gusman RN  04/07/2020  00:49      Gram stain aer bottle: Gram positive cocci in clusters resembling Staph       04/07/2020  08:12    Narrative:       Aerobic and anaerobic    Blood culture x two cultures. Draw prior to antibiotics. [[id number]] Collected:  04/05/20 2131    Order Status:  Completed Specimen:  Blood from Peripheral, Forearm, Right Updated:  04/07/20 0830     Blood Culture, Routine Gram stain april bottle: Gram positive cocci in clusters resembling Staph       Results called to and read back by: Heather Gusman RN  04/06/2020  21:29      Gram stain aer bottle: Gram positive cocci in  clusters resembling Staph       Positive results previously called 04/07/2020  00:05    Narrative:       Aerobic and anaerobic    Influenza A & B by Molecular [[id number]] Collected:  04/06/20 0011    Order Status:  Completed Specimen:  Nasopharyngeal Swab Updated:  04/06/20 0046     Influenza A, Molecular Negative     Influenza B, Molecular Negative     Flu A & B Source NP    Culture, Respiratory with Gram Stain [[id number]]     Order Status:  No result Specimen:  Sputum, Expectorated

## 2020-04-08 NOTE — PROGRESS NOTES
Hospital Medicine  Progress Note  [facility]      Patient Name: Cedric Almaraz  MRN:  [id number]  Hospital Medicine Team: [facility] HOSP MED J Malachi Terrazas MD  Date of Admission:  4/5/2020     Length of Stay:  LOS: 3 days       Principal Problem:  COVID-19 virus infection      HPI:  86yoM PMHx COPD, anemia, GERD presents to [facility] from [facility] for SOB. Patient with paperwork from a clinic stating that he had a chest x-ray performed day prior to admission which showed bibasilar infiltrates after complaining of dyspnea, his O2 sat at that time was 94%. The patient reports cough and pain in both shoulders but denies any other complaints including fever, chills, chest pain, abdominal pain, and nausea/vomiting/diarrhea.    Hospital Course:  Patient admitted for evaluation of possible COVID-19. Positive.    Interval History:     4/8: AAOx3. Pt on 3-4L NC mid 90s sats. Afebrile. Relatively comfortable during visit today.    Review of Systems:  Respiratory: sob  Cardiovascular: no cp  GI: no diarrhea    Inpatient Medications:    Current Facility-Administered Medications:     0.9%  NaCl infusion (for blood administration), , Intravenous, Q24H PRN, Keerthi Nascimento PA-C    albuterol inhaler 2 puff, 2 puff, Inhalation, Q6H WAKE, 2 puff at 04/08/20 1312 **AND** MDI Q6H WAKE, , , Q6H WAKE, Malachi Terrazas MD    azithromycin tablet 250 mg, 250 mg, Oral, Q24H, Malachi Terrazas MD, 250 mg at 04/08/20 0833    cefTRIAXone injection 1 g, 1 g, Intravenous, Q24H, Malachi Terrazas MD, 1 g at 04/08/20 0834    dextrose 10% (D10W) Bolus, 12.5 g, Intravenous, PRN, Malachi Terrazas MD    dextrose 10% (D10W) Bolus, 25 g, Intravenous, PRN, Malachi Terrazas MD    enoxaparin injection 40 mg, 40 mg, Subcutaneous, QHS, Malachi Terrazas MD, 40 mg at 04/07/20 8482    fluticasone furoate-vilanteroL 200-25 mcg/dose diskus inhaler 1 puff, 1 puff, Inhalation, Daily, Malachi Terrazas MD, 1 puff at  "04/08/20 0800    glucagon (human recombinant) injection 1 mg, 1 mg, Intramuscular, PRN, Malachi Terrazas MD    glucose chewable tablet 16 g, 16 g, Oral, PRN, Malachi Terrazas MD    glucose chewable tablet 24 g, 24 g, Oral, PRN, Malachi Terrazas MD    ipratropium inhaler 2 puff, 2 puff, Inhalation, Q6H WAKE, 2 puff at 04/08/20 1316 **AND** MDI Q6H PRN, , , Q6H PRN, Malachi Terrazas MD    sodium chloride 0.9% flush 10 mL, 10 mL, Intravenous, PRN, Malachi Terrazas MD    vancomycin 750 mg in dextrose 5 % 250 mL IVPB (ready to mix system), 750 mg, Intravenous, Q12H, Malachi Terrazas MD, Last Rate: 250 mL/hr at 04/08/20 0908, 750 mg at 04/08/20 0908      Physical Exam:      Intake/Output Summary (Last 24 hours) at 4/8/2020 1404  Last data filed at 4/7/2020 1800  Gross per 24 hour   Intake 240 ml   Output --   Net 240 ml     Wt Readings from Last 3 Encounters:   04/06/20 61.2 kg (134 lb 14.7 oz)       BP (!) 104/53 (BP Location: Right arm)   Pulse 89   Temp 98.1 °F (36.7 °C) (Oral)   Resp 20   Ht 5' 5" (1.651 m)   Wt 61.2 kg (134 lb 14.7 oz)   SpO2 (!) 92%   BMI 22.45 kg/m²     GEN: NAD, conversant  Resp: coarse bilateral breath sounds, normal wob on nc  CV: no edema  Skin: no rash    Laboratory:  Lab Results   Component Value Date    [id number] Positive (A) 04/05/2020       Recent Labs   Lab 04/05/20 2127 04/06/20  0717 04/07/20  0632   WBC 8.35 10.87 9.24   LYMPH 8.4*  0.7* 7.0*  0.8* 8.9*  0.8*   HGB 6.1* 8.4* 8.7*   HCT 22.7* 29.9* 30.8*    273 313     Recent Labs   Lab 04/05/20 2127 04/07/20  0632    139   K 4.2 3.9    104   CO2 24 24   BUN 23 15   CREATININE 1.5* 1.1    89   CALCIUM 8.2* 8.4*   MG  --  2.5   PHOS  --  2.6*     Recent Labs   Lab 04/05/20 2127 04/07/20  0632   ALKPHOS 102 109   ALT 53* 53*   * 75*   ALBUMIN 2.6* 2.5*   PROT 7.5 7.9   BILITOT 0.9 0.8        Recent Labs     04/05/20 2127 04/07/20  0632   DDIMER 0.91*  --    FERRITIN 81  --    CRP " 191.5* 180.8*   *  --    BNP 48  --    TROPONINI 0.022  --    *  --        All labs within the last 24 hours were reviewed.     Microbiology:  Microbiology Results (last 7 days)     Procedure Component Value Units Date/Time    Blood culture x two cultures. Draw prior to antibiotics. [[id number]] Collected:  04/05/20 2131    Order Status:  Completed Specimen:  Blood from Peripheral, Forearm, Right Updated:  04/08/20 1107     Blood Culture, Routine Gram stain april bottle: Gram positive cocci in clusters resembling Staph       Results called to and read back by: Heather Gusman RN  04/06/2020  21:29      Gram stain aer bottle: Gram positive cocci in clusters resembling Staph       Positive results previously called 04/07/2020  00:05    Narrative:       Aerobic and anaerobic    Blood culture [[id number]] Collected:  04/07/20 0638    Order Status:  Completed Specimen:  Blood Updated:  04/08/20 0812     Blood Culture, Routine No Growth to date      No Growth to date    Blood culture [[id number]] Collected:  04/07/20 0633    Order Status:  Completed Specimen:  Blood Updated:  04/08/20 0812     Blood Culture, Routine No Growth to date      No Growth to date    Blood culture x two cultures. Draw prior to antibiotics. [[id number]] Collected:  04/05/20 2130    Order Status:  Completed Specimen:  Blood from Peripheral, Antecubital, Right Updated:  04/08/20 0756     Blood Culture, Routine Gram stain april bottle: Gram positive cocci in clusters resembling Staph       Results called to and read back by: Heather Gusman RN  04/07/2020  00:49      Gram stain aer bottle: Gram positive cocci in clusters resembling Staph       04/07/2020  08:12    Narrative:       Aerobic and anaerobic    Influenza A & B by Molecular [[id number]] Collected:  04/06/20 0011    Order Status:  Completed Specimen:  Nasopharyngeal Swab Updated:  04/06/20 0046     Influenza A, Molecular Negative     Influenza B, Molecular Negative     Flu A & B Source NP     Culture, Respiratory with Gram Stain [[id number]]     Order Status:  No result Specimen:  Sputum, Expectorated             Imaging  ECG Results          EKG 12-lead (Final result)  Result time 04/06/20 10:03:38    Final result by Interface, Lab In [facility] (04/06/20 10:03:38)                 Narrative:    Test Reason : R68.89,    Vent. Rate : 055 BPM     Atrial Rate : 055 BPM     P-R Int : 132 ms          QRS Dur : 078 ms      QT Int : 482 ms       P-R-T Axes : 065 041 064 degrees     QTc Int : 461 ms    Sinus bradycardia with Premature atrial complexes  Otherwise normal ECG  When compared with ECG of 16-JAN-2012 12:33,  Premature atrial complexes are now Present  Confirmed by ANTONI MAN MD ([id number]) on 4/6/2020 10:03:30 AM    Referred By: ADAM   SELF           Confirmed By:ANTONI MAN MD                              No results found for this or any previous visit.    X-Ray Chest AP Portable  Narrative: EXAMINATION:  XR CHEST AP PORTABLE    CLINICAL HISTORY:  Suspected Covid-19 Virus Infection;    TECHNIQUE:  Single frontal view of the chest was performed.    COMPARISON:  01/16/2012.    FINDINGS:  Monitoring EKG leads are present. The trachea is unremarkable. There are calcifications of the aortic knob.  The cardiomediastinal silhouette is at the upper limits of normal. The right hemidiaphragm is unremarkable.  There is elevation of left hemidiaphragm.  There is no evidence of free air beneath the hemidiaphragms. There is no evidence of a pneumothorax.  There is no evidence of pneumomediastinum.  There are bibasilar ground-glass airspace opacities.  There are degenerative changes in the osseous structures.  Impression: Bibasilar ground-glass airspace opacities.  The findings may be seen with viral or bacterial pneumonia.    Electronically signed by: Juan Best MD  Date:    04/05/2020  Time:    21:54      All imaging within the last 24 hours was reviewed.     Assessment and Plan:    [facility]  Problems    Diagnosis  POA    *COVID-19 virus infection [U07.1]  Yes    COPD (chronic obstructive pulmonary disease) [J44.9]  Yes    Anemia [D64.9]  Yes    GERD (gastroesophageal reflux disease) [K21.9]  Yes    Multifocal pneumonia [J18.9]  Yes      Resolved Hospital Problems   No resolved problems to display.       Covid-19 Virus Infection  - COVID-19 testing: positive  - Infection Control notified    - Isolation:   - Airborne and Droplet Precautions  - Surgical mask on patient   - N95 masks must be fit tested, wear eye protection  - 20 second hand hygiene   - Limit visitors per hospital policy   - Consolidate lab draws, nursing care, and interventions    - Diagnostics: (Lymphopenia, hyponatremia, hyperferritinemia, elevated troponin, elevated d-dimer, age, and comorbidities are significant predictors of poor clinical outcome)   - CBC: Hgb 8.7 (8.4); Lymph 0.8  trend Q48hrs  - CMP:   Cr 1.1 (1.5); elev LFTs     trend Q48hrs  - Procalcitonin:  - D-dimer:0.91  repeat prior to discharge  - Ferritin:81  repeat prior to discharge   - CRP:    180 (191)    trend Q48hrs  - LDH: 436  repeat prior to discharge  - BNP:48  - Troponin:wnl    - ECG:qtc 461   - rapid Flu:neg   - RIP only if BMT/solid transplant:   - Legionella antigen:   - Blood culture x2:repeat cxs ngtd   - Sputum culture:   - CXR:as above   - UA and culture:   - CPK:544    - Management:   Bundle care as able to maintain isolation & minimize in/out of room   - Supplemental O2 to maintain SpO2 92%-96%   if requiring 6L NC or higher, place on nonrebreather and discuss case with MICU   - Telemetry & continuous pulse oximetry    - If wheezing   - albuterol inhaler 2-4 puff Q6hr PRN    - ipratropium daily    - acetaminophen 650mg PO Q6hr PRN fever   - loperamide PRN for viral diarrhea  - Empiric antibiotics per likely source & patient allergies    - CAP: x 5 day course (d/c early if low concern for bacterial co-infection)  Ceftriaxone 1g IV Q24hrs          "   Azithromycin 500mg IV day #1, then 250mg PO daily x4 days     If azithromycin is not available, start doxycycline                 If MRSA risk factors, add Vancomycin IV (PharmD consult)     - Investigational Treatment Protocol: (if patient meets criteria)   [email or web address]%[email or web address]     - statin: atorvastatin 40mg po daily (if CPK WNL)    - start HCQ 400mg PO BID x1 day, then 400mg PO daily x 4 days   (check glucose 6 phosphate dehydrogenase (NOT G6PD Quant), ECG on start & @48hrs, and start Qshift POCT glucose)    Safety notes:   - Avoid NIPPV (CPAP/BiPAP) to prevent aerosolization, use on a case-by-case basis if in neg pressure room   - Cautious use of NSAIDS for fever per WHO recommendations (3/16/2020)   - No new ACEi/ARB start or discontinuation of chronic med unless hypotensive (Esler et al. Journal of Hypertension 2020, 38:000-000)   - Careful use of steroids in the absence of other indications (shock, ARDS)   - Fluid sparing resuscitation, avoid maintenance fluids       Acute respiratory failure and COVID +  On empiric CAP therapy. Complete course 4/9  supp O2 prn.    Advance Care Planning  Goals of care, counseling/discussion   full  Advance Care Planning      Bacteremia - initial blood cultures  - repeating cultures, ngtd on repeat. Afebrile. Likely okay to stop abx 4/9, cont to monitor.  - initial cultures showing consistency with staph. vanc pharm and dose.    Anemia  - patient presents with microcytic anemia that required 1U prbc in ED with hgb recovery  - cont to monitor     Acute kidney injury   - stefani in ED which has since resolved  - trend CMP q48    If patient transitions to Comfort-Focused Care, please place "Nurse Communication: End of Life Care, family members allowed to visit, including spouse/partner and adult children [please list names]. Please ask family to visit as a group and leave as a group. "       VTE High Risk Prophylaxis: enoxaparin 40mg sq QHS @ 2100 (bundled care) if GFR >30    Patient's chronic/stable medical conditions noted in the problem list above will be managed with the patient's home medications as tolerated.     Level 3 63601 Total visit time was 35 minutes or greater with greater than 50% of time spent in counseling and coordination of care.     Malachi Terrazas MD  Spectralink: [phone]  Pager: [phone]

## 2020-04-08 NOTE — MEDICAL/APP STUDENT
Internal Medicine Telemed Plan of Care Note:    Called Cedric Almaraz 's daughter, Ghazal, to discuss patient's current clinical status. Briefly, patient is currently admitted for  COVID-19 infection. Patient tested positive for COVID-19.    Addressed family's questions and concerns in addition to updating the current clinical status of the patient. Specific concerns were addressed related to barriers to discharge being related to the patient's Oxygen requirements. Discussed that the patient is alert and oriented and has been stable for the past couple of days. Discussed patient's oxygen requirements (3-4 L nasal cannula oxygen). Reassured family that this will take time and that the need for oxygen is being addressed multiple times per day. Patient/Family member verbalized understanding of situation and plan.

## 2020-04-09 LAB
ALBUMIN SERPL BCP-MCNC: 2.3 G/DL (ref 3.5–5.2)
ALP SERPL-CCNC: 103 U/L (ref 55–135)
ALT SERPL W/O P-5'-P-CCNC: 69 U/L (ref 10–44)
ANION GAP SERPL CALC-SCNC: 9 MMOL/L (ref 8–16)
ANISOCYTOSIS BLD QL SMEAR: SLIGHT
AST SERPL-CCNC: 88 U/L (ref 10–40)
BACTERIA BLD CULT: ABNORMAL
BASO STIPL BLD QL SMEAR: ABNORMAL
BASOPHILS # BLD AUTO: 0.02 K/UL (ref 0–0.2)
BASOPHILS NFR BLD: 0.3 % (ref 0–1.9)
BILIRUB SERPL-MCNC: 0.5 MG/DL (ref 0.1–1)
BLD PROD TYP BPU: NORMAL
BLD PROD TYP BPU: NORMAL
BLOOD UNIT EXPIRATION DATE: NORMAL
BLOOD UNIT EXPIRATION DATE: NORMAL
BLOOD UNIT TYPE CODE: 5100
BLOOD UNIT TYPE CODE: 5100
BLOOD UNIT TYPE: NORMAL
BLOOD UNIT TYPE: NORMAL
BUN SERPL-MCNC: 10 MG/DL (ref 8–23)
CALCIUM SERPL-MCNC: 8.5 MG/DL (ref 8.7–10.5)
CHLORIDE SERPL-SCNC: 105 MMOL/L (ref 95–110)
CO2 SERPL-SCNC: 22 MMOL/L (ref 23–29)
CODING SYSTEM: NORMAL
CODING SYSTEM: NORMAL
CREAT SERPL-MCNC: 0.9 MG/DL (ref 0.5–1.4)
CRP SERPL-MCNC: 54.5 MG/L (ref 0–8.2)
DIFFERENTIAL METHOD: ABNORMAL
DISPENSE STATUS: NORMAL
DISPENSE STATUS: NORMAL
EOSINOPHIL # BLD AUTO: 0.1 K/UL (ref 0–0.5)
EOSINOPHIL NFR BLD: 1.6 % (ref 0–8)
ERYTHROCYTE [DISTWIDTH] IN BLOOD BY AUTOMATED COUNT: 27.1 % (ref 11.5–14.5)
EST. GFR  (AFRICAN AMERICAN): >60 ML/MIN/1.73 M^2
EST. GFR  (NON AFRICAN AMERICAN): >60 ML/MIN/1.73 M^2
GIANT PLATELETS BLD QL SMEAR: PRESENT
GLUCOSE SERPL-MCNC: 100 MG/DL (ref 70–110)
HCT VFR BLD AUTO: 29.6 % (ref 40–54)
HGB BLD-MCNC: 8.2 G/DL (ref 14–18)
HYPOCHROMIA BLD QL SMEAR: ABNORMAL
IMM GRANULOCYTES # BLD AUTO: 0.08 K/UL (ref 0–0.04)
IMM GRANULOCYTES NFR BLD AUTO: 1.1 % (ref 0–0.5)
LYMPHOCYTES # BLD AUTO: 0.9 K/UL (ref 1–4.8)
LYMPHOCYTES NFR BLD: 12.3 % (ref 18–48)
MAGNESIUM SERPL-MCNC: 2.1 MG/DL (ref 1.6–2.6)
MCH RBC QN AUTO: 20.8 PG (ref 27–31)
MCHC RBC AUTO-ENTMCNC: 27.7 G/DL (ref 32–36)
MCV RBC AUTO: 75 FL (ref 82–98)
MONOCYTES # BLD AUTO: 0.6 K/UL (ref 0.3–1)
MONOCYTES NFR BLD: 7.7 % (ref 4–15)
NEUTROPHILS # BLD AUTO: 5.8 K/UL (ref 1.8–7.7)
NEUTROPHILS NFR BLD: 77 % (ref 38–73)
NRBC BLD-RTO: 0 /100 WBC
OVALOCYTES BLD QL SMEAR: ABNORMAL
PHOSPHATE SERPL-MCNC: 2.7 MG/DL (ref 2.7–4.5)
PLATELET # BLD AUTO: 356 K/UL (ref 150–350)
PLATELET BLD QL SMEAR: ABNORMAL
PMV BLD AUTO: ABNORMAL FL (ref 9.2–12.9)
POCT GLUCOSE: 113 MG/DL (ref 70–110)
POIKILOCYTOSIS BLD QL SMEAR: SLIGHT
POLYCHROMASIA BLD QL SMEAR: ABNORMAL
POTASSIUM SERPL-SCNC: 3.7 MMOL/L (ref 3.5–5.1)
PROT SERPL-MCNC: 7.5 G/DL (ref 6–8.4)
RBC # BLD AUTO: 3.94 M/UL (ref 4.6–6.2)
SCHISTOCYTES BLD QL SMEAR: ABNORMAL
SODIUM SERPL-SCNC: 136 MMOL/L (ref 136–145)
SPHEROCYTES BLD QL SMEAR: ABNORMAL
TARGETS BLD QL SMEAR: ABNORMAL
TRANS ERYTHROCYTES VOL PATIENT: NORMAL ML
TRANS ERYTHROCYTES VOL PATIENT: NORMAL ML
WBC # BLD AUTO: 7.49 K/UL (ref 3.9–12.7)

## 2020-04-09 PROCEDURE — 63700000 PHARM REV CODE 250 ALT 637 W/O HCPCS: Performed by: FAMILY MEDICINE

## 2020-04-09 PROCEDURE — 99233 SBSQ HOSP IP/OBS HIGH 50: CPT | Mod: ,,, | Performed by: INTERNAL MEDICINE

## 2020-04-09 PROCEDURE — 83735 ASSAY OF MAGNESIUM: CPT

## 2020-04-09 PROCEDURE — 11000001 HC ACUTE MED/SURG PRIVATE ROOM

## 2020-04-09 PROCEDURE — 85025 COMPLETE CBC W/AUTO DIFF WBC: CPT

## 2020-04-09 PROCEDURE — 86140 C-REACTIVE PROTEIN: CPT

## 2020-04-09 PROCEDURE — 99233 PR SUBSEQUENT HOSPITAL CARE,LEVL III: ICD-10-PCS | Mod: ,,, | Performed by: INTERNAL MEDICINE

## 2020-04-09 PROCEDURE — 94640 AIRWAY INHALATION TREATMENT: CPT

## 2020-04-09 PROCEDURE — 27000221 HC OXYGEN, UP TO 24 HOURS

## 2020-04-09 PROCEDURE — 94761 N-INVAS EAR/PLS OXIMETRY MLT: CPT

## 2020-04-09 PROCEDURE — 25000242 PHARM REV CODE 250 ALT 637 W/ HCPCS: Performed by: INTERNAL MEDICINE

## 2020-04-09 PROCEDURE — 63600175 PHARM REV CODE 636 W HCPCS: Performed by: FAMILY MEDICINE

## 2020-04-09 PROCEDURE — 36415 COLL VENOUS BLD VENIPUNCTURE: CPT

## 2020-04-09 PROCEDURE — 80053 COMPREHEN METABOLIC PANEL: CPT

## 2020-04-09 PROCEDURE — 84100 ASSAY OF PHOSPHORUS: CPT

## 2020-04-09 RX ORDER — ALBUTEROL SULFATE 90 UG/1
2 AEROSOL, METERED RESPIRATORY (INHALATION)
Status: DISCONTINUED | OUTPATIENT
Start: 2020-04-09 | End: 2020-04-10 | Stop reason: HOSPADM

## 2020-04-09 RX ADMIN — IPRATROPIUM BROMIDE 2 PUFF: 17 AEROSOL, METERED RESPIRATORY (INHALATION) at 08:04

## 2020-04-09 RX ADMIN — FLUTICASONE FUROATE AND VILANTEROL TRIFENATATE 1 PUFF: 200; 25 POWDER RESPIRATORY (INHALATION) at 08:04

## 2020-04-09 RX ADMIN — ALBUTEROL SULFATE 2 PUFF: 90 AEROSOL, METERED RESPIRATORY (INHALATION) at 12:04

## 2020-04-09 RX ADMIN — IPRATROPIUM BROMIDE 2 PUFF: 17 AEROSOL, METERED RESPIRATORY (INHALATION) at 12:04

## 2020-04-09 RX ADMIN — CEFTRIAXONE SODIUM 1 G: 1 INJECTION, POWDER, FOR SOLUTION INTRAMUSCULAR; INTRAVENOUS at 08:04

## 2020-04-09 RX ADMIN — ALBUTEROL SULFATE 2 PUFF: 90 AEROSOL, METERED RESPIRATORY (INHALATION) at 08:04

## 2020-04-09 RX ADMIN — ENOXAPARIN SODIUM 40 MG: 100 INJECTION SUBCUTANEOUS at 08:04

## 2020-04-09 RX ADMIN — AZITHROMYCIN MONOHYDRATE 250 MG: 250 TABLET ORAL at 08:04

## 2020-04-09 NOTE — PROGRESS NOTES
Hospital Medicine  Progress Note  [facility]      Patient Name: Cedric Almaraz  MRN:  [id number]  Hospital Medicine Team: [facility] [place] HOSP MED J Da Nolan MD  Date of Admission:  4/5/2020     Length of Stay:  LOS: 4 days       Principal Problem:  COVID-19 virus infection      HPI:  86yoM PMHx COPD, anemia, GERD presents to [facility] from [facility] for SOB. Patient with paperwork from a clinic stating that he had a chest x-ray performed day prior to admission which showed bibasilar infiltrates after complaining of dyspnea, his O2 sat at that time was 94%. The patient reports cough and pain in both shoulders but denies any other complaints including fever, chills, chest pain, abdominal pain, and nausea/vomiting/diarrhea.    Hospital Course:  Patient admitted for evaluation of possible COVID-19. Positive.    Interval History:     4/8: AAOx3. Pt on 3L NC mid 90s sats. Afebrile. Relatively comfortable during visit today.    Review of Systems:  Respiratory: sob  Cardiovascular: no cp  GI: no diarrhea    Inpatient Medications:    Current Facility-Administered Medications:     0.9%  NaCl infusion (for blood administration), , Intravenous, Q24H PRN, Keerthi Nascimento PA-C    albuterol inhaler 2 puff, 2 puff, Inhalation, Q6H WAKE, 2 puff at 04/09/20 1249 **AND** MDI Q6H WAKE, , , Q6H WAKE, Da Nolan MD    dextrose 10% (D10W) Bolus, 12.5 g, Intravenous, PRN, Malachi Terrazas MD    dextrose 10% (D10W) Bolus, 25 g, Intravenous, PRN, Malachi Terrazas MD    enoxaparin injection 40 mg, 40 mg, Subcutaneous, QHS, Malachi Terrazas MD, 40 mg at 04/08/20 2132    fluticasone furoate-vilanteroL 200-25 mcg/dose diskus inhaler 1 puff, 1 puff, Inhalation, Daily, Malachi Terrazas MD, 1 puff at 04/08/20 0800    glucagon (human recombinant) injection 1 mg, 1 mg, Intramuscular, PRN, Malachi Terrazas MD    glucose chewable tablet 16 g, 16 g, Oral, PRN, Malachi Terrazas MD    glucose chewable  "tablet 24 g, 24 g, Oral, PRN, Malachi Terrazas MD    ipratropium inhaler 2 puff, 2 puff, Inhalation, Q6H WAKE, 2 puff at 04/09/20 1253 **AND** MDI Q6H PRN, , , Q6H PRN, Da Nolan MD    sodium chloride 0.9% flush 10 mL, 10 mL, Intravenous, PRN, Malachi Terrazas MD      Physical Exam:      Intake/Output Summary (Last 24 hours) at 4/9/2020 1254  Last data filed at 4/9/2020 1000  Gross per 24 hour   Intake 240 ml   Output --   Net 240 ml     Wt Readings from Last 3 Encounters:   04/06/20 61.2 kg (134 lb 14.7 oz)       /83   Pulse 78   Temp 97.2 °F (36.2 °C) (Oral)   Resp 18   Ht 5' 5" (1.651 m)   Wt 61.2 kg (134 lb 14.7 oz)   SpO2 95%   BMI 22.45 kg/m²     GEN: NAD, conversant  Resp: coarse bilateral breath sounds, normal wob on nc  CV: no edema  Skin: no rash    Laboratory:  Lab Results   Component Value Date    [id number] Positive (A) 04/05/2020       Recent Labs   Lab 04/06/20 0717 04/07/20  0632 04/09/20  0659   WBC 10.87 9.24 7.49   LYMPH 7.0*  0.8* 8.9*  0.8* 12.3*  0.9*   HGB 8.4* 8.7* 8.2*   HCT 29.9* 30.8* 29.6*    313 356*     Recent Labs   Lab 04/05/20 2127 04/07/20  0632 04/09/20  0659    139 136   K 4.2 3.9 3.7    104 105   CO2 24 24 22*   BUN 23 15 10   CREATININE 1.5* 1.1 0.9    89 100   CALCIUM 8.2* 8.4* 8.5*   MG  --  2.5 2.1   PHOS  --  2.6* 2.7     Recent Labs   Lab 04/05/20 2127 04/07/20  0632 04/09/20  0659   ALKPHOS 102 109 103   ALT 53* 53* 69*   * 75* 88*   ALBUMIN 2.6* 2.5* 2.3*   PROT 7.5 7.9 7.5   BILITOT 0.9 0.8 0.5        Recent Labs     04/07/20  0632 04/09/20  0659   .8* 54.5*       All labs within the last 24 hours were reviewed.     Microbiology:  Microbiology Results (last 7 days)     Procedure Component Value Units Date/Time    Blood culture x two cultures. Draw prior to antibiotics. [[id number]]  (Abnormal) Collected:  04/05/20 2130    Order Status:  Completed Specimen:  Blood from Peripheral, Antecubital, Right " Updated:  04/09/20 1132     Blood Culture, Routine Gram stain april bottle: Gram positive cocci in clusters resembling Staph       Results called to and read back by: Heather Gusman RN  04/07/2020  00:49      Gram stain aer bottle: Gram positive cocci in clusters resembling Staph       04/07/2020  08:12      COAGULASE-NEGATIVE STAPHYLOCOCCUS SPECIES  Multiple morphotyes - probable contaminates      Narrative:       Aerobic and anaerobic    Blood culture [[id number]] Collected:  04/07/20 0633    Order Status:  Completed Specimen:  Blood Updated:  04/09/20 0812     Blood Culture, Routine No Growth to date      No Growth to date      No Growth to date    Blood culture [[id number]] Collected:  04/07/20 0638    Order Status:  Completed Specimen:  Blood Updated:  04/09/20 0812     Blood Culture, Routine No Growth to date      No Growth to date      No Growth to date    Blood culture x two cultures. Draw prior to antibiotics. [[id number]]  (Abnormal) Collected:  04/05/20 2131    Order Status:  Completed Specimen:  Blood from Peripheral, Forearm, Right Updated:  04/08/20 1632     Blood Culture, Routine Gram stain april bottle: Gram positive cocci in clusters resembling Staph       Results called to and read back by: Heather Gusman RN  04/06/2020  21:29      Gram stain aer bottle: Gram positive cocci in clusters resembling Staph       Positive results previously called 04/07/2020  00:05      COAGULASE-NEGATIVE STAPHYLOCOCCUS SPECIES  Organism is a probable contaminant      Narrative:       Aerobic and anaerobic    Influenza A & B by Molecular [[id number]] Collected:  04/06/20 0011    Order Status:  Completed Specimen:  Nasopharyngeal Swab Updated:  04/06/20 0046     Influenza A, Molecular Negative     Influenza B, Molecular Negative     Flu A & B Source NP    Culture, Respiratory with Gram Stain [[id number]]     Order Status:  No result Specimen:  Sputum, Expectorated             Imaging  ECG Results          EKG 12-lead (Final result)   Result time 04/06/20 10:03:38    Final result by Interface, Lab In [facility] (04/06/20 10:03:38)                 Narrative:    Test Reason : R68.89,    Vent. Rate : 055 BPM     Atrial Rate : 055 BPM     P-R Int : 132 ms          QRS Dur : 078 ms      QT Int : 482 ms       P-R-T Axes : 065 041 064 degrees     QTc Int : 461 ms    Sinus bradycardia with Premature atrial complexes  Otherwise normal ECG  When compared with ECG of 16-JAN-2012 12:33,  Premature atrial complexes are now Present  Confirmed by ANTONI MAN MD ([id number]) on 4/6/2020 10:03:30 AM    Referred By: [email or web address]   SELF           Confirmed By:ANTONI MAN MD                              No results found for this or any previous visit.    X-Ray Chest AP Portable  Narrative: EXAMINATION:  XR CHEST AP PORTABLE    CLINICAL HISTORY:  Suspected Covid-19 Virus Infection;    TECHNIQUE:  Single frontal view of the chest was performed.    COMPARISON:  01/16/2012.    FINDINGS:  Monitoring EKG leads are present. The trachea is unremarkable. There are calcifications of the aortic knob.  The cardiomediastinal silhouette is at the upper limits of normal. The right hemidiaphragm is unremarkable.  There is elevation of left hemidiaphragm.  There is no evidence of free air beneath the hemidiaphragms. There is no evidence of a pneumothorax.  There is no evidence of pneumomediastinum.  There are bibasilar ground-glass airspace opacities.  There are degenerative changes in the osseous structures.  Impression: Bibasilar ground-glass airspace opacities.  The findings may be seen with viral or bacterial pneumonia.    Electronically signed by: Juan Best MD  Date:    04/05/2020  Time:    21:54      All imaging within the last 24 hours was reviewed.     Assessment and Plan:    Active Hospital Problems    Diagnosis  POA    *COVID-19 virus infection [U07.1]  Yes    COPD (chronic obstructive pulmonary disease) [J44.9]  Yes    Anemia [D64.9]  Yes    GERD (gastroesophageal  reflux disease) [K21.9]  Yes    Multifocal pneumonia [J18.9]  Yes      Resolved Hospital Problems   No resolved problems to display.       Covid-19 Virus Infection  - COVID-19 testing: positive  - Infection Control notified    - Isolation:   - Airborne and Droplet Precautions  - Surgical mask on patient   - N95 masks must be fit tested, wear eye protection  - 20 second hand hygiene   - Limit visitors per hospital policy   - Consolidate lab draws, nursing care, and interventions    - Diagnostics: (Lymphopenia, hyponatremia, hyperferritinemia, elevated troponin, elevated d-dimer, age, and comorbidities are significant predictors of poor clinical outcome)   - CBC: Hgb 8.7 (8.4); Lymph 0.8  trend Q48hrs  - CMP:   Cr 1.1 (1.5); elev LFTs     trend Q48hrs  - Procalcitonin:  - D-dimer:0.91  repeat prior to discharge  - Ferritin:81  repeat prior to discharge   - CRP:    180 (191)    trend Q48hrs  - LDH: 436  repeat prior to discharge  - BNP:48  - Troponin:wnl    - ECG:qtc 461   - rapid Flu:neg   - RIP only if BMT/solid transplant:   - Legionella antigen:   - Blood culture x2:repeat cxs ngtd   - Sputum culture:   - CXR:as above   - UA and culture:   - CPK:544    - Management:   Bundle care as able to maintain isolation & minimize in/out of room   - Supplemental O2 to maintain SpO2 92%-96%   if requiring 6L NC or higher, place on nonrebreather and discuss case with MICU   - Telemetry & continuous pulse oximetry    - If wheezing   - albuterol inhaler 2-4 puff Q6hr PRN    - ipratropium daily    - acetaminophen 650mg PO Q6hr PRN fever   - loperamide PRN for viral diarrhea  - Empiric antibiotics per likely source & patient allergies    - CAP: x 5 day course (d/c early if low concern for bacterial co-infection)  Ceftriaxone 1g IV Q24hrs            Azithromycin 500mg IV day #1, then 250mg PO daily x4 days     If azithromycin is not available, start doxycycline                 If MRSA risk factors, add Vancomycin IV (PharmD  "consult)     - Investigational Treatment Protocol: (if patient meets criteria)   [email or web address]     - statin: atorvastatin 40mg po daily (if CPK WNL)    - start HCQ 400mg PO BID x1 day, then 400mg PO daily x 4 days   (check glucose 6 phosphate dehydrogenase (NOT G6PD Quant), ECG on start & @48hrs, and start Qshift POCT glucose)    Safety notes:   - Avoid NIPPV (CPAP/BiPAP) to prevent aerosolization, use on a case-by-case basis if in neg pressure room   - Cautious use of NSAIDS for fever per WHO recommendations (3/16/2020)   - No new ACEi/ARB start or discontinuation of chronic med unless hypotensive (Esler et al. Journal of Hypertension 2020, 38:000-000)   - Careful use of steroids in the absence of other indications (shock, ARDS)   - Fluid sparing resuscitation, avoid maintenance fluids       Acute respiratory failure and COVID +  On empiric CAP therapy. Complete course 4/9  supp O2 prn.    Advance Care Planning  Goals of care, counseling/discussion   full  Advance Care Planning      Bacteremia - initial blood cultures  Coag negative staph  Repeat negative  Initially treated with vanc Will discontinue today since suspect contaminant.    Anemia  S/p 1U PRBC, hgb responded appropriately  Target hgb >7     Acute kidney injury  Resolved.    If patient transitions to Comfort-Focused Care, please place "Nurse Communication: End of Life Care, family members allowed to visit, including spouse/partner and adult children [please list names]. Please ask family to visit as a group and leave as a group.       VTE High Risk Prophylaxis: enoxaparin 40mg sq QHS @ 2100 (bundled care) if GFR >30    Patient's chronic/stable medical conditions noted in the problem list above will be managed with the patient's home medications as tolerated.     Level 3 [id number] Total visit time was 35 minutes or greater with greater than 50% of time spent " in counseling and coordination of care.     Diet: regular diet  Code: Full  Dispo: FOZIA Nolan MD  Hospital Medicine  Pager: [phone] Auptvdf; [phone]

## 2020-04-09 NOTE — CONSULTS
Cedric Almaraz  has been accepted for transfer to [facility] and will be followed through telemedicine services beginning 04/10/20  at 7am.    Concha Garcia

## 2020-04-09 NOTE — PROGRESS NOTES
Therapy with vancomycin complete and/or consult discontinued by provider.  Pharmacy will sign off, please re-consult as needed.    Lin Cunha, PharmD  [id number]

## 2020-04-09 NOTE — MEDICAL/APP STUDENT
Internal Medicine Telemed Plan of Care Note:    Called Cedric Almaraz 's daughter, Ghazal, to discuss patient's current clinical status. Briefly, patient is currently admitted for  COVID-19 infection. Patient tested positive for COVID-19.    Addressed family's questions and concerns in addition to updating the current clinical status of the patient. Notified family that Oxygen requirements have been fairly stable since yesterday and that the need for oxygen is being addressed multiple times per day. Discussed that Mr. Almaraz's vancomycin was stopped in lieu of negative repeat cultures. Family member verbalized understanding of situation and plan and had no further questions.

## 2020-04-09 NOTE — PLAN OF CARE
CM called to set up patient for transfer to [facility] at the [facility] Call Center [phone].  Patient is placed in will call and information packet emailed to [email or web address].  Ambulance packet placed at the nurses station.

## 2020-04-09 NOTE — PLAN OF CARE
Plan is to possibly discharge to [facility] if patient meets guide lines.       04/09/20 1475   Discharge Reassessment   Do you have any problems affording any of your prescribed medications? No   Discharge Plan A Other  ([facility])   Discharge Plan B Return to Nursing Home   Anticipated Discharge Disposition Int Care Fac   Post-Acute Status   Post-Acute Authorization Placement   Post-Acute Placement Status Referrals Sent

## 2020-04-10 VITALS
HEART RATE: 79 BPM | WEIGHT: 134.94 LBS | DIASTOLIC BLOOD PRESSURE: 98 MMHG | RESPIRATION RATE: 19 BRPM | TEMPERATURE: 97 F | BODY MASS INDEX: 22.48 KG/M2 | SYSTOLIC BLOOD PRESSURE: 144 MMHG | OXYGEN SATURATION: 93 % | HEIGHT: 65 IN

## 2020-04-10 LAB
POCT GLUCOSE: 102 MG/DL (ref 70–110)
POCT GLUCOSE: 89 MG/DL (ref 70–110)

## 2020-04-10 PROCEDURE — 99238 HOSP IP/OBS DSCHRG MGMT 30/<: CPT | Mod: 95,,, | Performed by: INTERNAL MEDICINE

## 2020-04-10 PROCEDURE — 99238 PR HOSPITAL DISCHARGE DAY,<30 MIN: ICD-10-PCS | Mod: 95,,, | Performed by: INTERNAL MEDICINE

## 2020-04-10 PROCEDURE — 27000221 HC OXYGEN, UP TO 24 HOURS

## 2020-04-10 PROCEDURE — 94640 AIRWAY INHALATION TREATMENT: CPT

## 2020-04-10 PROCEDURE — 94761 N-INVAS EAR/PLS OXIMETRY MLT: CPT

## 2020-04-10 RX ORDER — FLUTICASONE FUROATE AND VILANTEROL 200; 25 UG/1; UG/1
1 POWDER RESPIRATORY (INHALATION) DAILY
Qty: 60 EACH | Refills: 2 | Status: SHIPPED | OUTPATIENT
Start: 2020-04-11

## 2020-04-10 RX ORDER — ALBUTEROL SULFATE 90 UG/1
2 AEROSOL, METERED RESPIRATORY (INHALATION) EVERY 6 HOURS PRN
Qty: 18 G | Refills: 1 | Status: SHIPPED | OUTPATIENT
Start: 2020-04-10

## 2020-04-10 RX ADMIN — ALBUTEROL SULFATE 2 PUFF: 90 AEROSOL, METERED RESPIRATORY (INHALATION) at 08:04

## 2020-04-10 RX ADMIN — FLUTICASONE FUROATE AND VILANTEROL TRIFENATATE 1 PUFF: 200; 25 POWDER RESPIRATORY (INHALATION) at 08:04

## 2020-04-10 RX ADMIN — ALBUTEROL SULFATE 2 PUFF: 90 AEROSOL, METERED RESPIRATORY (INHALATION) at 01:04

## 2020-04-10 RX ADMIN — IPRATROPIUM BROMIDE 2 PUFF: 17 AEROSOL, METERED RESPIRATORY (INHALATION) at 01:04

## 2020-04-10 RX ADMIN — IPRATROPIUM BROMIDE 2 PUFF: 17 AEROSOL, METERED RESPIRATORY (INHALATION) at 08:04

## 2020-04-10 NOTE — PROGRESS NOTES
Hospital Medicine  Progress Note  [facility]      Patient Name: Cedric Almaraz  MRN:  [id number]  Hospital Medicine Team: VIRTUAL HOSPITAL MEDICINE TEAM 9 Néstor Jenkins MD  Date of Admission:  4/5/2020     Length of Stay:  LOS: 5 days       Principal Problem:  COVID-19 virus infection     This service was provided through telemedicine.  Visit type: Virtual visit with audio  Chief complaint: COVID-19 virus infection   The patient location is: [facility] A  Start time: 10:30  End time: 11:40  Total time spent with patient:10  Present with the patient at the time of the telemedicine/virtual assessment: 30 min    HPI:  86yoM PMHx COPD, anemia, GERD presents to [facility] from [facility] for SOB. Patient with paperwork from a clinic stating that he had a chest x-ray performed day prior to admission which showed bibasilar infiltrates after complaining of dyspnea, his O2 sat at that time was 94%. The patient reports cough and pain in both shoulders but denies any other complaints including fever, chills, chest pain, abdominal pain, and nausea/vomiting/diarrhea.    Hospital Course:  Patient admitted for evaluation of possible COVID-19. Positive.    Interval History:     Patient alert and understands he may transfer to the [facility].  His nurse says he is stable but on 1 L of O2.  Plan to wean today.    Review of Systems per chart:  Respiratory: sob  Cardiovascular: no cp  GI: no diarrhea    Inpatient Medications:    Current Facility-Administered Medications:     0.9%  NaCl infusion (for blood administration), , Intravenous, Q24H PRN, Keerthi Nascimento PA-C    albuterol inhaler 2 puff, 2 puff, Inhalation, Q6H WAKE, 2 puff at 04/10/20 0821 **AND** ESPERANZA Q6H WAKE, , , Q6H WAKE, Da Nolan MD    dextrose 10% (D10W) Bolus, 12.5 g, Intravenous, PRN, Malachi Terrazas MD    dextrose 10% (D10W) Bolus, 25 g, Intravenous, PRN, Malachi Terrazas MD    enoxaparin injection 40 mg,  "40 mg, Subcutaneous, QHS, Malachi Terrazas MD, 40 mg at 04/09/20 2032    fluticasone furoate-vilanteroL 200-25 mcg/dose diskus inhaler 1 puff, 1 puff, Inhalation, Daily, Malachi Terrazas MD, 1 puff at 04/10/20 0821    glucagon (human recombinant) injection 1 mg, 1 mg, Intramuscular, PRN, Malachi Terrazas MD    glucose chewable tablet 16 g, 16 g, Oral, PRN, Malachi Terrazas MD    glucose chewable tablet 24 g, 24 g, Oral, PRN, Malachi Terrazas MD    ipratropium inhaler 2 puff, 2 puff, Inhalation, Q6H WAKE, 2 puff at 04/10/20 0821 **AND** MDI Q6H PRN, , , Q6H PRN, Da Nolan MD    sodium chloride 0.9% flush 10 mL, 10 mL, Intravenous, PRN, Malachi Terrazas MD      Physical Exam per chart:      Intake/Output Summary (Last 24 hours) at 4/10/2020 1131  Last data filed at 4/10/2020 0900  Gross per 24 hour   Intake 600 ml   Output --   Net 600 ml     Wt Readings from Last 3 Encounters:   04/06/20 61.2 kg (134 lb 14.7 oz)       BP (!) 133/91   Pulse 73   Temp 97.3 °F (36.3 °C) (Oral)   Resp 17   Ht 5' 5" (1.651 m)   Wt 61.2 kg (134 lb 14.7 oz)   SpO2 99%   BMI 22.45 kg/m²     GEN: NAD, conversant  Resp: coarse bilateral breath sounds, normal wob on nc  CV: no edema  Skin: no rash    Laboratory:  Lab Results   Component Value Date    [id number] Positive (A) 04/05/2020       Recent Labs   Lab 04/06/20  0717 04/07/20  0632 04/09/20  0659   WBC 10.87 9.24 7.49   LYMPH 7.0*  0.8* 8.9*  0.8* 12.3*  0.9*   HGB 8.4* 8.7* 8.2*   HCT 29.9* 30.8* 29.6*    313 356*     Recent Labs   Lab 04/05/20 2127 04/07/20 0632 04/09/20  0659    139 136   K 4.2 3.9 3.7    104 105   CO2 24 24 22*   BUN 23 15 10   CREATININE 1.5* 1.1 0.9    89 100   CALCIUM 8.2* 8.4* 8.5*   MG  --  2.5 2.1   PHOS  --  2.6* 2.7     Recent Labs   Lab 04/05/20 2127 04/07/20  0632 04/09/20  0659   ALKPHOS 102 109 103   ALT 53* 53* 69*   * 75* 88*   ALBUMIN 2.6* 2.5* 2.3*   PROT 7.5 7.9 7.5   BILITOT 0.9 0.8 0.5    "     Recent Labs     04/09/20  0659   CRP 54.5*       All labs within the last 24 hours were reviewed.     Microbiology:  Microbiology Results (last 7 days)     Procedure Component Value Units Date/Time    Blood culture [[id number]] Collected:  04/07/20 0633    Order Status:  Completed Specimen:  Blood Updated:  04/10/20 0812     Blood Culture, Routine No Growth to date      No Growth to date      No Growth to date      No Growth to date    Blood culture [[id number]] Collected:  04/07/20 0638    Order Status:  Completed Specimen:  Blood Updated:  04/10/20 0812     Blood Culture, Routine No Growth to date      No Growth to date      No Growth to date      No Growth to date    Blood culture x two cultures. Draw prior to antibiotics. [[id number]]  (Abnormal) Collected:  04/05/20 2130    Order Status:  Completed Specimen:  Blood from Peripheral, Antecubital, Right Updated:  04/09/20 1132     Blood Culture, Routine Gram stain april bottle: Gram positive cocci in clusters resembling Staph       Results called to and read back by: Heather Gusman RN  04/07/2020  00:49      Gram stain aer bottle: Gram positive cocci in clusters resembling Staph       04/07/2020  08:12      COAGULASE-NEGATIVE STAPHYLOCOCCUS SPECIES  Multiple morphotyes - probable contaminates      Narrative:       Aerobic and anaerobic    Blood culture x two cultures. Draw prior to antibiotics. [[id number]]  (Abnormal) Collected:  04/05/20 2131    Order Status:  Completed Specimen:  Blood from Peripheral, Forearm, Right Updated:  04/08/20 1632     Blood Culture, Routine Gram stain april bottle: Gram positive cocci in clusters resembling Staph       Results called to and read back by: Heather Gusman RN  04/06/2020  21:29      Gram stain aer bottle: Gram positive cocci in clusters resembling Staph       Positive results previously called 04/07/2020  00:05      COAGULASE-NEGATIVE STAPHYLOCOCCUS SPECIES  Organism is a probable contaminant      Narrative:       Aerobic and  anaerobic    Influenza A & B by Molecular [[id number]] Collected:  04/06/20 0011    Order Status:  Completed Specimen:  Nasopharyngeal Swab Updated:  04/06/20 0046     Influenza A, Molecular Negative     Influenza B, Molecular Negative     Flu A & B Source NP    Culture, Respiratory with Gram Stain [[id number]]     Order Status:  No result Specimen:  Sputum, Expectorated             Imaging  ECG Results          EKG 12-lead (Final result)  Result time 04/06/20 10:03:38    Final result by Interface, Lab In [facility] (04/06/20 10:03:38)                 Narrative:    Test Reason : R68.89,    Vent. Rate : 055 BPM     Atrial Rate : 055 BPM     P-R Int : 132 ms          QRS Dur : 078 ms      QT Int : 482 ms       P-R-T Axes : 065 041 064 degrees     QTc Int : 461 ms    Sinus bradycardia with Premature atrial complexes  Otherwise normal ECG  When compared with ECG of 16-JAN-2012 12:33,  Premature atrial complexes are now Present  Confirmed by ANTONI MAN MD ([id number]) on 4/6/2020 10:03:30 AM    Referred By: [email or web address]   SELF           Confirmed By:ANTONI MAN MD                              No results found for this or any previous visit.    X-Ray Chest AP Portable  Narrative: EXAMINATION:  XR CHEST AP PORTABLE    CLINICAL HISTORY:  Suspected Covid-19 Virus Infection;    TECHNIQUE:  Single frontal view of the chest was performed.    COMPARISON:  01/16/2012.    FINDINGS:  Monitoring EKG leads are present. The trachea is unremarkable. There are calcifications of the aortic knob.  The cardiomediastinal silhouette is at the upper limits of normal. The right hemidiaphragm is unremarkable.  There is elevation of left hemidiaphragm.  There is no evidence of free air beneath the hemidiaphragms. There is no evidence of a pneumothorax.  There is no evidence of pneumomediastinum.  There are bibasilar ground-glass airspace opacities.  There are degenerative changes in the osseous structures.  Impression: Bibasilar ground-glass  airspace opacities.  The findings may be seen with viral or bacterial pneumonia.    Electronically signed by: Juan Best MD  Date:    04/05/2020  Time:    21:54      All imaging within the last 24 hours was reviewed.     Assessment and Plan:    Active Hospital Problems    Diagnosis  POA    *COVID-19 virus infection [U07.1]  Yes    COPD (chronic obstructive pulmonary disease) [J44.9]  Yes    Anemia [D64.9]  Yes    GERD (gastroesophageal reflux disease) [K21.9]  Yes    Multifocal pneumonia [J18.9]  Yes      Resolved Hospital Problems   No resolved problems to display.       Covid-19 Virus Infection  - COVID-19 testing: positive  - Infection Control notified    - Isolation:   - Airborne and Droplet Precautions  - Surgical mask on patient   - N95 masks must be fit tested, wear eye protection  - 20 second hand hygiene   - Limit visitors per hospital policy   - Consolidate lab draws, nursing care, and interventions    - Diagnostics: (Lymphopenia, hyponatremia, hyperferritinemia, elevated troponin, elevated d-dimer, age, and comorbidities are significant predictors of poor clinical outcome)   - CBC: Hgb 8.7 (8.4); Lymph 0.8  trend Q48hrs  - CMP:   Cr 1.1 (1.5); elev LFTs     trend Q48hrs  - Procalcitonin:  - D-dimer:0.91  repeat prior to discharge  - Ferritin:81  repeat prior to discharge   - CRP:    180 (191)    trend Q48hrs  - LDH: 436  repeat prior to discharge  - BNP:48  - Troponin:wnl    - ECG:qtc 461   - rapid Flu:neg   - RIP only if BMT/solid transplant:   - Legionella antigen:   - Blood culture x2:repeat cxs ngtd   - Sputum culture:   - CXR:as above   - UA and culture:   - CPK:544    - Management:   Bundle care as able to maintain isolation & minimize in/out of room   - Supplemental O2 to maintain SpO2 92%-96%   if requiring 6L NC or higher, place on nonrebreather and discuss case with MICU   - Telemetry & continuous pulse oximetry    - If wheezing   - albuterol inhaler 2-4 puff Q6hr PRN    - ipratropium  "daily    - acetaminophen 650mg PO Q6hr PRN fever   - loperamide PRN for viral diarrhea  - Empiric antibiotics per likely source & patient allergies    - CAP: x 5 day course (d/c early if low concern for bacterial co-infection)  Ceftriaxone 1g IV Q24hrs            Azithromycin 500mg IV day #1, then 250mg PO daily x4 days     If azithromycin is not available, start doxycycline                 If MRSA risk factors, add Vancomycin IV (PharmD consult)     - Investigational Treatment Protocol: (if patient meets criteria)   [email or web address]     - statin: atorvastatin 40mg po daily (if CPK WNL)    - start HCQ 400mg PO BID x1 day, then 400mg PO daily x 4 days   (check glucose 6 phosphate dehydrogenase (NOT G6PD Quant), ECG on start & @48hrs, and start Qshift POCT glucose)    Safety notes:   - Avoid NIPPV (CPAP/BiPAP) to prevent aerosolization, use on a case-by-case basis if in neg pressure room   - Cautious use of NSAIDS for fever per WHO recommendations (3/16/2020)   - No new ACEi/ARB start or discontinuation of chronic med unless hypotensive (Esler et al. Journal of Hypertension 2020, 38:000-000)   - Careful use of steroids in the absence of other indications (shock, ARDS)   - Fluid sparing resuscitation, avoid maintenance fluids       Acute respiratory failure and COVID +  On empiric CAP therapy. Complete course 4/9  supp O2 prn.    Advance Care Planning  Goals of care, counseling/discussion   full  Advance Care Planning      Bacteremia - initial blood cultures  Coag negative staph  Repeat negative  Initially treated with vanc Will discontinue today since suspect contaminant.    Anemia  S/p 1U PRBC, hgb responded appropriately  Target hgb >7     Acute kidney injury  Resolved.    If patient transitions to Comfort-Focused Care, please place "Nurse Communication: End of Life Care, family members allowed to visit, including " spouse/partner and adult children [please list names]. Please ask family to visit as a group and leave as a group.       VTE High Risk Prophylaxis: enoxaparin 40mg sq QHS @ 2100 (bundled care) if GFR >30    Patient's chronic/stable medical conditions noted in the problem list above will be managed with the patient's home medications as tolerated.     Level 3 60416 Total visit time was 35 minutes or greater with greater than 50% of time spent in counseling and coordination of care.     Diet: regular diet  Code: Full  Dispo: [facility]

## 2020-04-10 NOTE — PLAN OF CARE
CM called [facility] to check status of [facility] referral since pt is ready to d/c. Bed available, transportation pick-up scheduled between 2:45pm - 3:00pm. CM printed required documents to complete ambulance packet.      CM notified team and Ambulance packet sent to 11th floor via tube.    CM picked up d/c meds from [facility] and delivered to [facility]. Medications will transport with pt to [facility].       Yanet Noe RN   - [facility]  [phone]

## 2020-04-10 NOTE — PLAN OF CARE
04/10/20 1400   Final Note   Assessment Type Final Discharge Note   Anticipated Discharge Disposition Int Care Fac   Right Care Referral Info   Post Acute Recommendation Other   Facility Name [facility].   [place], [place]   Post-Acute Status   Post-Acute Authorization Placement   Post-Acute Placement Status Set-up Complete

## 2020-04-10 NOTE — DISCHARGE SUMMARY
Hospital Medicine  Progress Note  [facility]      Patient Name: Cedric Almaraz  MRN:  [id number]  Hospital Medicine Team: VIRTUAL HOSPITAL MEDICINE TEAM 9 Néstor Jenkins MD  Date of Admission:  4/5/2020     Length of Stay:  LOS: 5 days       Principal Problem:  COVID-19 virus infection     This service was provided through telemedicine.  Visit type: Virtual visit with audio  Chief complaint: COVID-19 virus infection   [facility]  Start time: 10:30  End time: 11:40  Total time spent with patient:10  Present with the patient at the time of the telemedicine/virtual assessment: 30 min    HPI:  86yoM PMHx COPD, anemia, GERD [place] [facility] for SOB. Patient with paperwork from a clinic stating that he had a chest x-ray performed day prior to admission which showed bibasilar infiltrates after complaining of dyspnea, his O2 sat at that time was 94%. The patient reports cough and pain in both shoulders but denies any other complaints including fever, chills, chest pain, abdominal pain, and nausea/vomiting/diarrhea.    Hospital Course:  Patient admitted for evaluation of possible COVID-19. Positive.    Interval History:     Patient alert and understands he may transfer to the [facility].  His nurse says he is stable but on 1 L of O2.  Plan to wean today.    Review of Systems per chart:  Respiratory: sob  Cardiovascular: no cp  GI: no diarrhea    Inpatient Medications:    Current Facility-Administered Medications:     0.9%  NaCl infusion (for blood administration), , Intravenous, Q24H PRN, Keerthi Nascimento PA-C    albuterol inhaler 2 puff, 2 puff, Inhalation, Q6H WAKE, 2 puff at 04/10/20 1327 **AND** ESPERANZA Q6H WAKE, , , Q6H WAKE, Da Nolan MD    dextrose 10% (D10W) Bolus, 12.5 g, Intravenous, PRN, Malachi Terrazas MD    dextrose 10% (D10W) Bolus, 25 g, Intravenous, PRN, Malachi Terrazas MD    enoxaparin injection 40 mg,  "40 mg, Subcutaneous, QHS, Malachi Terrazas MD, 40 mg at 04/09/20 2032    fluticasone furoate-vilanteroL 200-25 mcg/dose diskus inhaler 1 puff, 1 puff, Inhalation, Daily, Malachi Terrazas MD, 1 puff at 04/10/20 0821    glucagon (human recombinant) injection 1 mg, 1 mg, Intramuscular, PRN, Malachi Terrazas MD    glucose chewable tablet 16 g, 16 g, Oral, PRN, Malachi Terrazas MD    glucose chewable tablet 24 g, 24 g, Oral, PRN, Malachi Terrazas MD    ipratropium inhaler 2 puff, 2 puff, Inhalation, Q6H WAKE, 2 puff at 04/10/20 1327 **AND** MDI Q6H PRN, , , Q6H PRN, Da Nolan MD    sodium chloride 0.9% flush 10 mL, 10 mL, Intravenous, PRN, Malachi Terrazas MD      Physical Exam per chart:      Intake/Output Summary (Last 24 hours) at 4/10/2020 1342  Last data filed at 4/10/2020 0900  Gross per 24 hour   Intake 400 ml   Output --   Net 400 ml     Wt Readings from Last 3 Encounters:   04/06/20 61.2 kg (134 lb 14.7 oz)       BP (!) 133/91   Pulse 77   Temp 97.3 °F (36.3 °C) (Oral)   Resp 19   Ht 5' 5" (1.651 m)   Wt 61.2 kg (134 lb 14.7 oz)   SpO2 (!) 91%   BMI 22.45 kg/m²     GEN: NAD, conversant  Resp: coarse bilateral breath sounds, normal wob on nc  CV: no edema  Skin: no rash    Laboratory:  Lab Results   Component Value Date    [id number] Positive (A) 04/05/2020       Recent Labs   Lab 04/06/20  0717 04/07/20  0632 04/09/20  0659   WBC 10.87 9.24 7.49   LYMPH 7.0*  0.8* 8.9*  0.8* 12.3*  0.9*   HGB 8.4* 8.7* 8.2*   HCT 29.9* 30.8* 29.6*    313 356*     Recent Labs   Lab 04/05/20 2127 04/07/20  0632 04/09/20  0659    139 136   K 4.2 3.9 3.7    104 105   CO2 24 24 22*   BUN 23 15 10   CREATININE 1.5* 1.1 0.9    89 100   CALCIUM 8.2* 8.4* 8.5*   MG  --  2.5 2.1   PHOS  --  2.6* 2.7     Recent Labs   Lab 04/05/20 2127 04/07/20  0632 04/09/20  0659   ALKPHOS 102 109 103   ALT 53* 53* 69*   * 75* 88*   ALBUMIN 2.6* 2.5* 2.3*   PROT 7.5 7.9 7.5   BILITOT 0.9 0.8 0.5 "        Recent Labs     04/09/20  0659   CRP 54.5*       All labs within the last 24 hours were reviewed.     Microbiology:  Microbiology Results (last 7 days)     Procedure Component Value Units Date/Time    Blood culture [[id number]] Collected:  04/07/20 0633    Order Status:  Completed Specimen:  Blood Updated:  04/10/20 0812     Blood Culture, Routine No Growth to date      No Growth to date      No Growth to date      No Growth to date    Blood culture [[id number]] Collected:  04/07/20 0638    Order Status:  Completed Specimen:  Blood Updated:  04/10/20 0812     Blood Culture, Routine No Growth to date      No Growth to date      No Growth to date      No Growth to date    Blood culture x two cultures. Draw prior to antibiotics. [[id number]]  (Abnormal) Collected:  04/05/20 2130    Order Status:  Completed Specimen:  Blood from Peripheral, Antecubital, Right Updated:  04/09/20 1132     Blood Culture, Routine Gram stain april bottle: Gram positive cocci in clusters resembling Staph       Results called to and read back by: Heather Gusman RN  04/07/2020  00:49      Gram stain aer bottle: Gram positive cocci in clusters resembling Staph       04/07/2020  08:12      COAGULASE-NEGATIVE STAPHYLOCOCCUS SPECIES  Multiple morphotyes - probable contaminates      Narrative:       Aerobic and anaerobic    Blood culture x two cultures. Draw prior to antibiotics. [[id number]]  (Abnormal) Collected:  04/05/20 2131    Order Status:  Completed Specimen:  Blood from Peripheral, Forearm, Right Updated:  04/08/20 1632     Blood Culture, Routine Gram stain april bottle: Gram positive cocci in clusters resembling Staph       Results called to and read back by: Heather Gusman RN  04/06/2020  21:29      Gram stain aer bottle: Gram positive cocci in clusters resembling Staph       Positive results previously called 04/07/2020  00:05      COAGULASE-NEGATIVE STAPHYLOCOCCUS SPECIES  Organism is a probable contaminant      Narrative:       Aerobic and  anaerobic    Influenza A & B by Molecular [[id number]] Collected:  04/06/20 0011    Order Status:  Completed Specimen:  Nasopharyngeal Swab Updated:  04/06/20 0046     Influenza A, Molecular Negative     Influenza B, Molecular Negative     Flu A & B Source NP    Culture, Respiratory with Gram Stain [[id number]]     Order Status:  No result Specimen:  Sputum, Expectorated             Imaging  ECG Results          EKG 12-lead (Final result)  Result time 04/06/20 10:03:38    Final result by Interface, Lab In [facility] (04/06/20 10:03:38)                 Narrative:    Test Reason : R68.89,    Vent. Rate : 055 BPM     Atrial Rate : 055 BPM     P-R Int : 132 ms          QRS Dur : 078 ms      QT Int : 482 ms       P-R-T Axes : 065 041 064 degrees     QTc Int : 461 ms    Sinus bradycardia with Premature atrial complexes  Otherwise normal ECG  When compared with ECG of 16-JAN-2012 12:33,  Premature atrial complexes are now Present  Confirmed by ANTONI MAN MD ([id number]) on 4/6/2020 10:03:30 AM    Referred By: [email or web address]   SELF           Confirmed By:ANTONI MAN MD                              No results found for this or any previous visit.    X-Ray Chest AP Portable  Narrative: EXAMINATION:  XR CHEST AP PORTABLE    CLINICAL HISTORY:  Suspected Covid-19 Virus Infection;    TECHNIQUE:  Single frontal view of the chest was performed.    COMPARISON:  01/16/2012.    FINDINGS:  Monitoring EKG leads are present. The trachea is unremarkable. There are calcifications of the aortic knob.  The cardiomediastinal silhouette is at the upper limits of normal. The right hemidiaphragm is unremarkable.  There is elevation of left hemidiaphragm.  There is no evidence of free air beneath the hemidiaphragms. There is no evidence of a pneumothorax.  There is no evidence of pneumomediastinum.  There are bibasilar ground-glass airspace opacities.  There are degenerative changes in the osseous structures.  Impression: Bibasilar ground-glass  airspace opacities.  The findings may be seen with viral or bacterial pneumonia.    Electronically signed by: Juan Best MD  Date:    04/05/2020  Time:    21:54      All imaging within the last 24 hours was reviewed.     Assessment and Plan:    Active Hospital Problems    Diagnosis  POA    *COVID-19 virus infection [U07.1]  Yes    COPD (chronic obstructive pulmonary disease) [J44.9]  Yes    Anemia [D64.9]  Yes    GERD (gastroesophageal reflux disease) [K21.9]  Yes    Multifocal pneumonia [J18.9]  Yes      Resolved Hospital Problems   No resolved problems to display.       Covid-19 Virus Infection  - COVID-19 testing: positive  - Infection Control notified    - Isolation:   - Airborne and Droplet Precautions  - Surgical mask on patient   - N95 masks must be fit tested, wear eye protection  - 20 second hand hygiene   - Limit visitors per hospital policy   - Consolidate lab draws, nursing care, and interventions    - Diagnostics: (Lymphopenia, hyponatremia, hyperferritinemia, elevated troponin, elevated d-dimer, age, and comorbidities are significant predictors of poor clinical outcome)   - CBC: Hgb 8.7 (8.4); Lymph 0.8  trend Q48hrs  - CMP:   Cr 1.1 (1.5); elev LFTs     trend Q48hrs  - Procalcitonin:  - D-dimer:0.91  repeat prior to discharge  - Ferritin:81  repeat prior to discharge   - CRP:    180 (191)    trend Q48hrs  - LDH: 436  repeat prior to discharge  - BNP:48  - Troponin:wnl    - ECG:qtc 461   - rapid Flu:neg   - RIP only if BMT/solid transplant:   - Legionella antigen:   - Blood culture x2:repeat cxs ngtd   - Sputum culture:   - CXR:as above   - UA and culture:   - CPK:544    - Management:   Bundle care as able to maintain isolation & minimize in/out of room   - Supplemental O2 to maintain SpO2 92%-96%   if requiring 6L NC or higher, place on nonrebreather and discuss case with MICU   - Telemetry & continuous pulse oximetry    - If wheezing   - albuterol inhaler 2-4 puff Q6hr PRN    - ipratropium  "daily    - acetaminophen 650mg PO Q6hr PRN fever   - loperamide PRN for viral diarrhea  - Empiric antibiotics per likely source & patient allergies    - CAP: x 5 day course (d/c early if low concern for bacterial co-infection)  Ceftriaxone 1g IV Q24hrs            Azithromycin 500mg IV day #1, then 250mg PO daily x4 days     If azithromycin is not available, start doxycycline                 If MRSA risk factors, add Vancomycin IV (PharmD consult)     - Investigational Treatment Protocol: (if patient meets criteria)   [email or web address]%[email or web address]_[email or web address]     - statin: atorvastatin 40mg po daily (if CPK WNL)    - start HCQ 400mg PO BID x1 day, then 400mg PO daily x 4 days   (check glucose 6 phosphate dehydrogenase (NOT G6PD Quant), ECG on start & @48hrs, and start Qshift POCT glucose)    Safety notes:   - Avoid NIPPV (CPAP/BiPAP) to prevent aerosolization, use on a case-by-case basis if in neg pressure room   - Cautious use of NSAIDS for fever per WHO recommendations (3/16/2020)   - No new ACEi/ARB start or discontinuation of chronic med unless hypotensive (Esler et al. Journal of Hypertension 2020, 38:000-000)   - Careful use of steroids in the absence of other indications (shock, ARDS)   - Fluid sparing resuscitation, avoid maintenance fluids       Acute respiratory failure and COVID +  On empiric CAP therapy. Complete course 4/9  supp O2 prn.    Advance Care Planning  Goals of care, counseling/discussion   full  Advance Care Planning      Bacteremia - initial blood cultures  Coag negative staph  Repeat negative  Initially treated with vanc Will discontinue today since suspect contaminant.    Anemia  S/p 1U PRBC, hgb responded appropriately  Target hgb >7     Acute kidney injury  Resolved.    If patient transitions to Comfort-Focused Care, please place "Nurse Communication: End of Life Care, family members allowed to visit, including " spouse/partner and adult children [please list names]. Please ask family to visit as a group and leave as a group.       VTE High Risk Prophylaxis: enoxaparin 40mg sq QHS @ 2100 (bundled care) if GFR >30    Patient's chronic/stable medical conditions noted in the problem list above will be managed with the patient's home medications as tolerated.       Diet: regular diet  Code: Full  Dispo: [facility]

## 2020-04-10 NOTE — PLAN OF CARE
Pt AAO x2, NAD. O2 1L NC in use. Plan of care reviewed with pt. Isolation precautions maintained. Call light within pt reach, pt instructed to call staff for any needed assistance, safety maintained. Pt denies needs/concerns at this time, will continue to monitor.

## 2020-04-11 LAB
BACTERIA BLD CULT: NORMAL
BACTERIA BLD CULT: NORMAL

## 2020-04-13 NOTE — PLAN OF CARE
SW contacted  Mariely TRAORE  ([place] , nurse located on pts floor) that he had DC to the [facility] for further treatment.     Lenka Velasco LMSW   Licensed Master Social Worker

## 2020-04-14 ENCOUNTER — OUTPATIENT CASE MANAGEMENT (OUTPATIENT)
Dept: ADMINISTRATIVE | Facility: OTHER | Age: 85
End: 2020-04-14

## 2020-04-14 NOTE — PROGRESS NOTES
Outpatient Care Management  COVID-19 Patient Assessment    Patient: Cedric Almaraz  MRN: [id number]  Date of Service: 04/14/2020  Completed by: Montserrat Tierney RN  Program: COVID-19    Reason for Visit   Patient presents with    OPCM Chart Review     4/14/20    COVID-19 Concerns     4/14/20    Case Closure     [facility]       Brief Summary: [facility] for further medical treatment. Closing case.    Assessment Documentation     COVID-19 Assessment    Nurse Assessment via Chart Review:  Nurse Assessment with Patient:  For Fever:  For Coughing:  For Difficulty Breathing:  Psycho/Social Assessment:         Problem List and History     Patient Active Problem List   Diagnosis    COPD (chronic obstructive pulmonary disease)    Anemia    GERD (gastroesophageal reflux disease)    Multifocal pneumonia    COVID-19 virus infection       Reviewed Active Problem List with patient and/or Caregiver. The following were identified as areas of need:     Medical History:  Reviewed medical history with patient and/or caregiver    Social History:  Reviewed social history with patient and/or caregiver    Complex Care Plan    Care plan was discussed and completed today with input from patient and/or caregiver.    Patient Instructions     Instructions were provided via the [facility] patient resources and are available for the patient to view on the patient portal, if active.      No follow-ups on file.    Todays OPCM Self-Management Care Plan was developed with the patients/caregivers input and was based on identified barriers from todays assessment.  Goals were written today with the patient/caregiver and the patient has agreed to work towards these goals to improve his/her overall well-being. Patient verbalized understanding of the care plan, goals, and all of today's instructions. Encouraged patient/caregiver to communicate with his/her physician and health care team about  health conditions and the treatment plan.  Provided my contact information today and encouraged patient/caregiver to call me with any questions as needed.

## 2020-07-22 ENCOUNTER — LAB VISIT (OUTPATIENT)
Dept: LAB | Facility: OTHER | Age: 85
End: 2020-07-22
Payer: MEDICARE

## 2020-07-22 DIAGNOSIS — Z03.818 ENCOUNTER FOR OBSERVATION FOR SUSPECTED EXPOSURE TO OTHER BIOLOGICAL AGENTS RULED OUT: ICD-10-CM

## 2020-07-22 DIAGNOSIS — Z20.822 SUSPECTED COVID-19 VIRUS INFECTION: ICD-10-CM

## 2020-07-22 PROCEDURE — U0003 INFECTIOUS AGENT DETECTION BY NUCLEIC ACID (DNA OR RNA); SEVERE ACUTE RESPIRATORY SYNDROME CORONAVIRUS 2 (SARS-COV-2) (CORONAVIRUS DISEASE [COVID-19]), AMPLIFIED PROBE TECHNIQUE, MAKING USE OF HIGH THROUGHPUT TECHNOLOGIES AS DESCRIBED BY CMS-2020-01-R: HCPCS

## 2020-07-26 LAB — SARS-COV-2 RNA RESP QL NAA+PROBE: NEGATIVE

## 2020-08-31 ENCOUNTER — LAB VISIT (OUTPATIENT)
Dept: LAB | Facility: OTHER | Age: 85
End: 2020-08-31
Payer: MEDICARE

## 2020-08-31 DIAGNOSIS — Z03.818 ENCOUNTER FOR OBSERVATION FOR SUSPECTED EXPOSURE TO OTHER BIOLOGICAL AGENTS RULED OUT: ICD-10-CM

## 2020-08-31 PROCEDURE — U0003 INFECTIOUS AGENT DETECTION BY NUCLEIC ACID (DNA OR RNA); SEVERE ACUTE RESPIRATORY SYNDROME CORONAVIRUS 2 (SARS-COV-2) (CORONAVIRUS DISEASE [COVID-19]), AMPLIFIED PROBE TECHNIQUE, MAKING USE OF HIGH THROUGHPUT TECHNOLOGIES AS DESCRIBED BY CMS-2020-01-R: HCPCS

## 2020-09-01 LAB — SARS-COV-2 RNA RESP QL NAA+PROBE: NOT DETECTED

## 2020-09-07 ENCOUNTER — LAB VISIT (OUTPATIENT)
Dept: LAB | Facility: OTHER | Age: 85
End: 2020-09-07
Payer: MEDICARE

## 2020-09-07 DIAGNOSIS — Z03.818 ENCOUNTER FOR OBSERVATION FOR SUSPECTED EXPOSURE TO OTHER BIOLOGICAL AGENTS RULED OUT: ICD-10-CM

## 2020-09-07 PROCEDURE — U0003 INFECTIOUS AGENT DETECTION BY NUCLEIC ACID (DNA OR RNA); SEVERE ACUTE RESPIRATORY SYNDROME CORONAVIRUS 2 (SARS-COV-2) (CORONAVIRUS DISEASE [COVID-19]), AMPLIFIED PROBE TECHNIQUE, MAKING USE OF HIGH THROUGHPUT TECHNOLOGIES AS DESCRIBED BY CMS-2020-01-R: HCPCS

## 2020-09-08 LAB — SARS-COV-2 RNA RESP QL NAA+PROBE: NOT DETECTED

## 2020-09-14 ENCOUNTER — LAB VISIT (OUTPATIENT)
Dept: LAB | Facility: OTHER | Age: 85
End: 2020-09-14
Attending: INTERNAL MEDICINE
Payer: MEDICARE

## 2020-09-14 DIAGNOSIS — Z03.818 ENCOUNTER FOR OBSERVATION FOR SUSPECTED EXPOSURE TO OTHER BIOLOGICAL AGENTS RULED OUT: ICD-10-CM

## 2020-09-14 PROCEDURE — U0003 INFECTIOUS AGENT DETECTION BY NUCLEIC ACID (DNA OR RNA); SEVERE ACUTE RESPIRATORY SYNDROME CORONAVIRUS 2 (SARS-COV-2) (CORONAVIRUS DISEASE [COVID-19]), AMPLIFIED PROBE TECHNIQUE, MAKING USE OF HIGH THROUGHPUT TECHNOLOGIES AS DESCRIBED BY CMS-2020-01-R: HCPCS

## 2020-09-15 LAB — SARS-COV-2 RNA RESP QL NAA+PROBE: NOT DETECTED

## 2020-09-21 ENCOUNTER — LAB VISIT (OUTPATIENT)
Dept: LAB | Facility: OTHER | Age: 85
End: 2020-09-21
Attending: INTERNAL MEDICINE
Payer: MEDICARE

## 2020-09-21 DIAGNOSIS — Z03.818 ENCOUNTER FOR OBSERVATION FOR SUSPECTED EXPOSURE TO OTHER BIOLOGICAL AGENTS RULED OUT: ICD-10-CM

## 2020-09-21 PROCEDURE — U0003 INFECTIOUS AGENT DETECTION BY NUCLEIC ACID (DNA OR RNA); SEVERE ACUTE RESPIRATORY SYNDROME CORONAVIRUS 2 (SARS-COV-2) (CORONAVIRUS DISEASE [COVID-19]), AMPLIFIED PROBE TECHNIQUE, MAKING USE OF HIGH THROUGHPUT TECHNOLOGIES AS DESCRIBED BY CMS-2020-01-R: HCPCS

## 2020-09-22 LAB — SARS-COV-2 RNA RESP QL NAA+PROBE: NOT DETECTED

## 2020-09-28 ENCOUNTER — LAB VISIT (OUTPATIENT)
Dept: LAB | Facility: OTHER | Age: 85
End: 2020-09-28
Payer: MEDICARE

## 2020-09-28 DIAGNOSIS — Z03.818 ENCOUNTER FOR OBSERVATION FOR SUSPECTED EXPOSURE TO OTHER BIOLOGICAL AGENTS RULED OUT: ICD-10-CM

## 2020-09-28 PROCEDURE — U0003 INFECTIOUS AGENT DETECTION BY NUCLEIC ACID (DNA OR RNA); SEVERE ACUTE RESPIRATORY SYNDROME CORONAVIRUS 2 (SARS-COV-2) (CORONAVIRUS DISEASE [COVID-19]), AMPLIFIED PROBE TECHNIQUE, MAKING USE OF HIGH THROUGHPUT TECHNOLOGIES AS DESCRIBED BY CMS-2020-01-R: HCPCS

## 2020-09-29 LAB — SARS-COV-2 RNA RESP QL NAA+PROBE: NOT DETECTED

## 2020-10-19 ENCOUNTER — LAB VISIT (OUTPATIENT)
Dept: LAB | Facility: OTHER | Age: 85
End: 2020-10-19
Payer: MEDICARE

## 2020-10-19 DIAGNOSIS — Z03.818 ENCOUNTER FOR OBSERVATION FOR SUSPECTED EXPOSURE TO OTHER BIOLOGICAL AGENTS RULED OUT: ICD-10-CM

## 2020-10-19 PROCEDURE — U0003 INFECTIOUS AGENT DETECTION BY NUCLEIC ACID (DNA OR RNA); SEVERE ACUTE RESPIRATORY SYNDROME CORONAVIRUS 2 (SARS-COV-2) (CORONAVIRUS DISEASE [COVID-19]), AMPLIFIED PROBE TECHNIQUE, MAKING USE OF HIGH THROUGHPUT TECHNOLOGIES AS DESCRIBED BY CMS-2020-01-R: HCPCS

## 2020-10-20 LAB — SARS-COV-2 RNA RESP QL NAA+PROBE: NOT DETECTED

## 2020-10-26 ENCOUNTER — LAB VISIT (OUTPATIENT)
Dept: LAB | Facility: OTHER | Age: 85
End: 2020-10-26
Payer: MEDICARE

## 2020-10-26 DIAGNOSIS — Z03.818 ENCOUNTER FOR OBSERVATION FOR SUSPECTED EXPOSURE TO OTHER BIOLOGICAL AGENTS RULED OUT: ICD-10-CM

## 2020-10-26 PROCEDURE — U0003 INFECTIOUS AGENT DETECTION BY NUCLEIC ACID (DNA OR RNA); SEVERE ACUTE RESPIRATORY SYNDROME CORONAVIRUS 2 (SARS-COV-2) (CORONAVIRUS DISEASE [COVID-19]), AMPLIFIED PROBE TECHNIQUE, MAKING USE OF HIGH THROUGHPUT TECHNOLOGIES AS DESCRIBED BY CMS-2020-01-R: HCPCS

## 2020-10-27 LAB — SARS-COV-2 RNA RESP QL NAA+PROBE: NOT DETECTED

## 2020-11-09 ENCOUNTER — LAB VISIT (OUTPATIENT)
Dept: LAB | Facility: OTHER | Age: 85
End: 2020-11-09
Payer: MEDICARE

## 2020-11-09 DIAGNOSIS — Z03.818 ENCOUNTER FOR OBSERVATION FOR SUSPECTED EXPOSURE TO OTHER BIOLOGICAL AGENTS RULED OUT: ICD-10-CM

## 2020-11-09 PROCEDURE — U0003 INFECTIOUS AGENT DETECTION BY NUCLEIC ACID (DNA OR RNA); SEVERE ACUTE RESPIRATORY SYNDROME CORONAVIRUS 2 (SARS-COV-2) (CORONAVIRUS DISEASE [COVID-19]), AMPLIFIED PROBE TECHNIQUE, MAKING USE OF HIGH THROUGHPUT TECHNOLOGIES AS DESCRIBED BY CMS-2020-01-R: HCPCS

## 2020-11-10 LAB — SARS-COV-2 RNA RESP QL NAA+PROBE: NOT DETECTED

## 2020-12-07 ENCOUNTER — LAB VISIT (OUTPATIENT)
Dept: LAB | Facility: OTHER | Age: 85
End: 2020-12-07
Payer: MEDICARE

## 2020-12-07 DIAGNOSIS — Z03.818 ENCOUNTER FOR OBSERVATION FOR SUSPECTED EXPOSURE TO OTHER BIOLOGICAL AGENTS RULED OUT: ICD-10-CM

## 2020-12-07 PROCEDURE — U0003 INFECTIOUS AGENT DETECTION BY NUCLEIC ACID (DNA OR RNA); SEVERE ACUTE RESPIRATORY SYNDROME CORONAVIRUS 2 (SARS-COV-2) (CORONAVIRUS DISEASE [COVID-19]), AMPLIFIED PROBE TECHNIQUE, MAKING USE OF HIGH THROUGHPUT TECHNOLOGIES AS DESCRIBED BY CMS-2020-01-R: HCPCS

## 2020-12-09 LAB — SARS-COV-2 RNA RESP QL NAA+PROBE: NOT DETECTED

## 2020-12-10 ENCOUNTER — LAB VISIT (OUTPATIENT)
Dept: LAB | Facility: OTHER | Age: 85
End: 2020-12-10
Payer: MEDICARE

## 2020-12-10 DIAGNOSIS — Z03.818 ENCOUNTER FOR OBSERVATION FOR SUSPECTED EXPOSURE TO OTHER BIOLOGICAL AGENTS RULED OUT: ICD-10-CM

## 2020-12-10 PROCEDURE — U0003 INFECTIOUS AGENT DETECTION BY NUCLEIC ACID (DNA OR RNA); SEVERE ACUTE RESPIRATORY SYNDROME CORONAVIRUS 2 (SARS-COV-2) (CORONAVIRUS DISEASE [COVID-19]), AMPLIFIED PROBE TECHNIQUE, MAKING USE OF HIGH THROUGHPUT TECHNOLOGIES AS DESCRIBED BY CMS-2020-01-R: HCPCS

## 2020-12-12 LAB — SARS-COV-2 RNA RESP QL NAA+PROBE: NOT DETECTED

## 2020-12-14 ENCOUNTER — LAB VISIT (OUTPATIENT)
Dept: LAB | Facility: OTHER | Age: 85
End: 2020-12-14
Payer: MEDICARE

## 2020-12-14 DIAGNOSIS — Z03.818 ENCOUNTER FOR OBSERVATION FOR SUSPECTED EXPOSURE TO OTHER BIOLOGICAL AGENTS RULED OUT: ICD-10-CM

## 2020-12-14 PROCEDURE — U0003 INFECTIOUS AGENT DETECTION BY NUCLEIC ACID (DNA OR RNA); SEVERE ACUTE RESPIRATORY SYNDROME CORONAVIRUS 2 (SARS-COV-2) (CORONAVIRUS DISEASE [COVID-19]), AMPLIFIED PROBE TECHNIQUE, MAKING USE OF HIGH THROUGHPUT TECHNOLOGIES AS DESCRIBED BY CMS-2020-01-R: HCPCS

## 2020-12-15 LAB — SARS-COV-2 RNA RESP QL NAA+PROBE: NOT DETECTED

## 2020-12-21 ENCOUNTER — LAB VISIT (OUTPATIENT)
Dept: LAB | Facility: OTHER | Age: 85
End: 2020-12-21
Payer: MEDICARE

## 2020-12-21 DIAGNOSIS — Z03.818 ENCOUNTER FOR OBSERVATION FOR SUSPECTED EXPOSURE TO OTHER BIOLOGICAL AGENTS RULED OUT: ICD-10-CM

## 2020-12-21 PROCEDURE — U0003 INFECTIOUS AGENT DETECTION BY NUCLEIC ACID (DNA OR RNA); SEVERE ACUTE RESPIRATORY SYNDROME CORONAVIRUS 2 (SARS-COV-2) (CORONAVIRUS DISEASE [COVID-19]), AMPLIFIED PROBE TECHNIQUE, MAKING USE OF HIGH THROUGHPUT TECHNOLOGIES AS DESCRIBED BY CMS-2020-01-R: HCPCS

## 2020-12-23 LAB — SARS-COV-2 RNA RESP QL NAA+PROBE: NOT DETECTED

## 2020-12-28 ENCOUNTER — LAB VISIT (OUTPATIENT)
Dept: LAB | Facility: OTHER | Age: 85
End: 2020-12-28
Payer: MEDICARE

## 2020-12-28 DIAGNOSIS — Z03.818 ENCOUNTER FOR OBSERVATION FOR SUSPECTED EXPOSURE TO OTHER BIOLOGICAL AGENTS RULED OUT: ICD-10-CM

## 2020-12-28 PROCEDURE — U0003 INFECTIOUS AGENT DETECTION BY NUCLEIC ACID (DNA OR RNA); SEVERE ACUTE RESPIRATORY SYNDROME CORONAVIRUS 2 (SARS-COV-2) (CORONAVIRUS DISEASE [COVID-19]), AMPLIFIED PROBE TECHNIQUE, MAKING USE OF HIGH THROUGHPUT TECHNOLOGIES AS DESCRIBED BY CMS-2020-01-R: HCPCS

## 2020-12-29 LAB — SARS-COV-2 RNA RESP QL NAA+PROBE: NOT DETECTED

## 2021-01-05 ENCOUNTER — LAB VISIT (OUTPATIENT)
Dept: LAB | Facility: OTHER | Age: 86
End: 2021-01-05
Payer: MEDICARE

## 2021-01-05 DIAGNOSIS — Z03.818 ENCOUNTER FOR OBSERVATION FOR SUSPECTED EXPOSURE TO OTHER BIOLOGICAL AGENTS RULED OUT: ICD-10-CM

## 2021-01-05 PROCEDURE — U0003 INFECTIOUS AGENT DETECTION BY NUCLEIC ACID (DNA OR RNA); SEVERE ACUTE RESPIRATORY SYNDROME CORONAVIRUS 2 (SARS-COV-2) (CORONAVIRUS DISEASE [COVID-19]), AMPLIFIED PROBE TECHNIQUE, MAKING USE OF HIGH THROUGHPUT TECHNOLOGIES AS DESCRIBED BY CMS-2020-01-R: HCPCS

## 2021-01-07 LAB — SARS-COV-2 RNA RESP QL NAA+PROBE: NOT DETECTED

## 2021-01-12 ENCOUNTER — LAB VISIT (OUTPATIENT)
Dept: LAB | Facility: OTHER | Age: 86
End: 2021-01-12
Payer: MEDICARE

## 2021-01-12 DIAGNOSIS — Z20.822 ENCOUNTER FOR LABORATORY TESTING FOR COVID-19 VIRUS: ICD-10-CM

## 2021-01-12 PROCEDURE — U0003 INFECTIOUS AGENT DETECTION BY NUCLEIC ACID (DNA OR RNA); SEVERE ACUTE RESPIRATORY SYNDROME CORONAVIRUS 2 (SARS-COV-2) (CORONAVIRUS DISEASE [COVID-19]), AMPLIFIED PROBE TECHNIQUE, MAKING USE OF HIGH THROUGHPUT TECHNOLOGIES AS DESCRIBED BY CMS-2020-01-R: HCPCS

## 2021-01-14 LAB — SARS-COV-2 RNA RESP QL NAA+PROBE: NOT DETECTED

## 2021-01-19 ENCOUNTER — LAB VISIT (OUTPATIENT)
Dept: LAB | Facility: OTHER | Age: 86
End: 2021-01-19
Payer: MEDICARE

## 2021-01-19 DIAGNOSIS — Z20.822 ENCOUNTER FOR LABORATORY TESTING FOR COVID-19 VIRUS: ICD-10-CM

## 2021-01-19 PROCEDURE — U0003 INFECTIOUS AGENT DETECTION BY NUCLEIC ACID (DNA OR RNA); SEVERE ACUTE RESPIRATORY SYNDROME CORONAVIRUS 2 (SARS-COV-2) (CORONAVIRUS DISEASE [COVID-19]), AMPLIFIED PROBE TECHNIQUE, MAKING USE OF HIGH THROUGHPUT TECHNOLOGIES AS DESCRIBED BY CMS-2020-01-R: HCPCS

## 2021-01-21 LAB — SARS-COV-2 RNA RESP QL NAA+PROBE: NOT DETECTED

## 2021-01-26 ENCOUNTER — LAB VISIT (OUTPATIENT)
Dept: LAB | Facility: OTHER | Age: 86
End: 2021-01-26
Payer: MEDICARE

## 2021-01-26 DIAGNOSIS — Z20.822 ENCOUNTER FOR LABORATORY TESTING FOR COVID-19 VIRUS: ICD-10-CM

## 2021-01-26 PROCEDURE — U0003 INFECTIOUS AGENT DETECTION BY NUCLEIC ACID (DNA OR RNA); SEVERE ACUTE RESPIRATORY SYNDROME CORONAVIRUS 2 (SARS-COV-2) (CORONAVIRUS DISEASE [COVID-19]), AMPLIFIED PROBE TECHNIQUE, MAKING USE OF HIGH THROUGHPUT TECHNOLOGIES AS DESCRIBED BY CMS-2020-01-R: HCPCS

## 2021-01-27 LAB — SARS-COV-2 RNA RESP QL NAA+PROBE: NOT DETECTED

## 2021-02-02 ENCOUNTER — LAB VISIT (OUTPATIENT)
Dept: LAB | Facility: OTHER | Age: 86
End: 2021-02-02
Payer: MEDICARE

## 2021-02-02 DIAGNOSIS — Z20.822 ENCOUNTER FOR LABORATORY TESTING FOR COVID-19 VIRUS: ICD-10-CM

## 2021-02-02 PROCEDURE — U0003 INFECTIOUS AGENT DETECTION BY NUCLEIC ACID (DNA OR RNA); SEVERE ACUTE RESPIRATORY SYNDROME CORONAVIRUS 2 (SARS-COV-2) (CORONAVIRUS DISEASE [COVID-19]), AMPLIFIED PROBE TECHNIQUE, MAKING USE OF HIGH THROUGHPUT TECHNOLOGIES AS DESCRIBED BY CMS-2020-01-R: HCPCS

## 2021-02-03 LAB — SARS-COV-2 RNA RESP QL NAA+PROBE: NOT DETECTED

## 2021-02-09 ENCOUNTER — LAB VISIT (OUTPATIENT)
Dept: LAB | Facility: OTHER | Age: 86
End: 2021-02-09
Payer: MEDICARE

## 2021-02-09 DIAGNOSIS — Z20.822 ENCOUNTER FOR LABORATORY TESTING FOR COVID-19 VIRUS: ICD-10-CM

## 2021-02-09 PROCEDURE — U0003 INFECTIOUS AGENT DETECTION BY NUCLEIC ACID (DNA OR RNA); SEVERE ACUTE RESPIRATORY SYNDROME CORONAVIRUS 2 (SARS-COV-2) (CORONAVIRUS DISEASE [COVID-19]), AMPLIFIED PROBE TECHNIQUE, MAKING USE OF HIGH THROUGHPUT TECHNOLOGIES AS DESCRIBED BY CMS-2020-01-R: HCPCS

## 2021-02-10 LAB — SARS-COV-2 RNA RESP QL NAA+PROBE: NOT DETECTED

## 2021-02-16 ENCOUNTER — LAB VISIT (OUTPATIENT)
Dept: LAB | Facility: OTHER | Age: 86
End: 2021-02-16
Payer: MEDICARE

## 2021-02-16 DIAGNOSIS — Z20.822 ENCOUNTER FOR LABORATORY TESTING FOR COVID-19 VIRUS: ICD-10-CM

## 2021-02-16 PROCEDURE — U0003 INFECTIOUS AGENT DETECTION BY NUCLEIC ACID (DNA OR RNA); SEVERE ACUTE RESPIRATORY SYNDROME CORONAVIRUS 2 (SARS-COV-2) (CORONAVIRUS DISEASE [COVID-19]), AMPLIFIED PROBE TECHNIQUE, MAKING USE OF HIGH THROUGHPUT TECHNOLOGIES AS DESCRIBED BY CMS-2020-01-R: HCPCS

## 2021-02-18 LAB — SARS-COV-2 RNA RESP QL NAA+PROBE: NOT DETECTED

## 2021-02-23 ENCOUNTER — LAB VISIT (OUTPATIENT)
Dept: LAB | Facility: OTHER | Age: 86
End: 2021-02-23
Payer: MEDICARE

## 2021-02-23 DIAGNOSIS — Z20.822 ENCOUNTER FOR LABORATORY TESTING FOR COVID-19 VIRUS: ICD-10-CM

## 2021-02-23 PROCEDURE — U0003 INFECTIOUS AGENT DETECTION BY NUCLEIC ACID (DNA OR RNA); SEVERE ACUTE RESPIRATORY SYNDROME CORONAVIRUS 2 (SARS-COV-2) (CORONAVIRUS DISEASE [COVID-19]), AMPLIFIED PROBE TECHNIQUE, MAKING USE OF HIGH THROUGHPUT TECHNOLOGIES AS DESCRIBED BY CMS-2020-01-R: HCPCS

## 2021-02-25 LAB — SARS-COV-2 RNA RESP QL NAA+PROBE: NOT DETECTED

## 2021-03-02 ENCOUNTER — LAB VISIT (OUTPATIENT)
Dept: LAB | Facility: OTHER | Age: 86
End: 2021-03-02
Payer: MEDICARE

## 2021-03-02 DIAGNOSIS — Z20.822 ENCOUNTER FOR LABORATORY TESTING FOR COVID-19 VIRUS: ICD-10-CM

## 2021-03-02 PROCEDURE — U0003 INFECTIOUS AGENT DETECTION BY NUCLEIC ACID (DNA OR RNA); SEVERE ACUTE RESPIRATORY SYNDROME CORONAVIRUS 2 (SARS-COV-2) (CORONAVIRUS DISEASE [COVID-19]), AMPLIFIED PROBE TECHNIQUE, MAKING USE OF HIGH THROUGHPUT TECHNOLOGIES AS DESCRIBED BY CMS-2020-01-R: HCPCS

## 2021-03-03 LAB — SARS-COV-2 RNA RESP QL NAA+PROBE: NOT DETECTED

## 2021-03-09 ENCOUNTER — LAB VISIT (OUTPATIENT)
Dept: LAB | Facility: OTHER | Age: 86
End: 2021-03-09
Payer: MEDICARE

## 2021-03-09 DIAGNOSIS — Z20.822 ENCOUNTER FOR LABORATORY TESTING FOR COVID-19 VIRUS: ICD-10-CM

## 2021-03-09 PROCEDURE — U0003 INFECTIOUS AGENT DETECTION BY NUCLEIC ACID (DNA OR RNA); SEVERE ACUTE RESPIRATORY SYNDROME CORONAVIRUS 2 (SARS-COV-2) (CORONAVIRUS DISEASE [COVID-19]), AMPLIFIED PROBE TECHNIQUE, MAKING USE OF HIGH THROUGHPUT TECHNOLOGIES AS DESCRIBED BY CMS-2020-01-R: HCPCS | Performed by: NURSE PRACTITIONER

## 2021-03-10 LAB — SARS-COV-2 RNA RESP QL NAA+PROBE: NOT DETECTED

## 2021-03-16 ENCOUNTER — LAB VISIT (OUTPATIENT)
Dept: LAB | Facility: OTHER | Age: 86
End: 2021-03-16
Payer: MEDICARE

## 2021-03-16 DIAGNOSIS — Z20.822 ENCOUNTER FOR LABORATORY TESTING FOR COVID-19 VIRUS: ICD-10-CM

## 2021-03-16 PROCEDURE — U0003 INFECTIOUS AGENT DETECTION BY NUCLEIC ACID (DNA OR RNA); SEVERE ACUTE RESPIRATORY SYNDROME CORONAVIRUS 2 (SARS-COV-2) (CORONAVIRUS DISEASE [COVID-19]), AMPLIFIED PROBE TECHNIQUE, MAKING USE OF HIGH THROUGHPUT TECHNOLOGIES AS DESCRIBED BY CMS-2020-01-R: HCPCS | Performed by: NURSE PRACTITIONER

## 2021-03-17 LAB — SARS-COV-2 RNA RESP QL NAA+PROBE: NOT DETECTED

## 2021-03-23 ENCOUNTER — LAB VISIT (OUTPATIENT)
Dept: LAB | Facility: OTHER | Age: 86
End: 2021-03-23
Payer: MEDICARE

## 2021-03-23 DIAGNOSIS — Z20.822 ENCOUNTER FOR LABORATORY TESTING FOR COVID-19 VIRUS: ICD-10-CM

## 2021-03-23 PROCEDURE — U0003 INFECTIOUS AGENT DETECTION BY NUCLEIC ACID (DNA OR RNA); SEVERE ACUTE RESPIRATORY SYNDROME CORONAVIRUS 2 (SARS-COV-2) (CORONAVIRUS DISEASE [COVID-19]), AMPLIFIED PROBE TECHNIQUE, MAKING USE OF HIGH THROUGHPUT TECHNOLOGIES AS DESCRIBED BY CMS-2020-01-R: HCPCS | Performed by: NURSE PRACTITIONER

## 2021-03-24 LAB — SARS-COV-2 RNA RESP QL NAA+PROBE: NOT DETECTED

## 2021-03-30 ENCOUNTER — LAB VISIT (OUTPATIENT)
Dept: LAB | Facility: OTHER | Age: 86
End: 2021-03-30
Payer: MEDICARE

## 2021-03-30 DIAGNOSIS — Z20.822 ENCOUNTER FOR LABORATORY TESTING FOR COVID-19 VIRUS: ICD-10-CM

## 2021-03-30 PROCEDURE — U0003 INFECTIOUS AGENT DETECTION BY NUCLEIC ACID (DNA OR RNA); SEVERE ACUTE RESPIRATORY SYNDROME CORONAVIRUS 2 (SARS-COV-2) (CORONAVIRUS DISEASE [COVID-19]), AMPLIFIED PROBE TECHNIQUE, MAKING USE OF HIGH THROUGHPUT TECHNOLOGIES AS DESCRIBED BY CMS-2020-01-R: HCPCS | Performed by: NURSE PRACTITIONER

## 2021-03-31 LAB — SARS-COV-2 RNA RESP QL NAA+PROBE: NOT DETECTED

## 2021-04-06 ENCOUNTER — LAB VISIT (OUTPATIENT)
Dept: LAB | Facility: OTHER | Age: 86
End: 2021-04-06
Payer: MEDICARE

## 2021-04-06 DIAGNOSIS — Z20.822 ENCOUNTER FOR LABORATORY TESTING FOR COVID-19 VIRUS: ICD-10-CM

## 2021-04-06 PROCEDURE — U0003 INFECTIOUS AGENT DETECTION BY NUCLEIC ACID (DNA OR RNA); SEVERE ACUTE RESPIRATORY SYNDROME CORONAVIRUS 2 (SARS-COV-2) (CORONAVIRUS DISEASE [COVID-19]), AMPLIFIED PROBE TECHNIQUE, MAKING USE OF HIGH THROUGHPUT TECHNOLOGIES AS DESCRIBED BY CMS-2020-01-R: HCPCS | Performed by: NURSE PRACTITIONER

## 2021-04-07 LAB — SARS-COV-2 RNA RESP QL NAA+PROBE: NOT DETECTED

## 2021-04-13 ENCOUNTER — LAB VISIT (OUTPATIENT)
Dept: LAB | Facility: OTHER | Age: 86
End: 2021-04-13
Payer: MEDICARE

## 2021-04-13 DIAGNOSIS — Z20.822 ENCOUNTER FOR LABORATORY TESTING FOR COVID-19 VIRUS: ICD-10-CM

## 2021-04-13 PROCEDURE — U0003 INFECTIOUS AGENT DETECTION BY NUCLEIC ACID (DNA OR RNA); SEVERE ACUTE RESPIRATORY SYNDROME CORONAVIRUS 2 (SARS-COV-2) (CORONAVIRUS DISEASE [COVID-19]), AMPLIFIED PROBE TECHNIQUE, MAKING USE OF HIGH THROUGHPUT TECHNOLOGIES AS DESCRIBED BY CMS-2020-01-R: HCPCS | Performed by: NURSE PRACTITIONER

## 2021-04-14 LAB — SARS-COV-2 RNA RESP QL NAA+PROBE: NOT DETECTED

## 2021-04-20 ENCOUNTER — LAB VISIT (OUTPATIENT)
Dept: LAB | Facility: OTHER | Age: 86
End: 2021-04-20
Payer: MEDICARE

## 2021-04-20 DIAGNOSIS — Z20.822 ENCOUNTER FOR LABORATORY TESTING FOR COVID-19 VIRUS: ICD-10-CM

## 2021-04-20 PROCEDURE — U0003 INFECTIOUS AGENT DETECTION BY NUCLEIC ACID (DNA OR RNA); SEVERE ACUTE RESPIRATORY SYNDROME CORONAVIRUS 2 (SARS-COV-2) (CORONAVIRUS DISEASE [COVID-19]), AMPLIFIED PROBE TECHNIQUE, MAKING USE OF HIGH THROUGHPUT TECHNOLOGIES AS DESCRIBED BY CMS-2020-01-R: HCPCS | Performed by: NURSE PRACTITIONER

## 2021-04-21 LAB — SARS-COV-2 RNA RESP QL NAA+PROBE: NOT DETECTED

## 2021-04-27 ENCOUNTER — LAB VISIT (OUTPATIENT)
Dept: LAB | Facility: OTHER | Age: 86
End: 2021-04-27
Payer: MEDICARE

## 2021-04-27 DIAGNOSIS — Z20.822 ENCOUNTER FOR LABORATORY TESTING FOR COVID-19 VIRUS: ICD-10-CM

## 2021-04-27 PROCEDURE — U0003 INFECTIOUS AGENT DETECTION BY NUCLEIC ACID (DNA OR RNA); SEVERE ACUTE RESPIRATORY SYNDROME CORONAVIRUS 2 (SARS-COV-2) (CORONAVIRUS DISEASE [COVID-19]), AMPLIFIED PROBE TECHNIQUE, MAKING USE OF HIGH THROUGHPUT TECHNOLOGIES AS DESCRIBED BY CMS-2020-01-R: HCPCS | Performed by: NURSE PRACTITIONER

## 2021-04-28 LAB — SARS-COV-2 RNA RESP QL NAA+PROBE: NOT DETECTED

## 2021-05-04 ENCOUNTER — LAB VISIT (OUTPATIENT)
Dept: LAB | Facility: OTHER | Age: 86
End: 2021-05-04
Payer: MEDICARE

## 2021-05-04 DIAGNOSIS — Z20.822 ENCOUNTER FOR LABORATORY TESTING FOR COVID-19 VIRUS: ICD-10-CM

## 2021-05-04 PROCEDURE — U0003 INFECTIOUS AGENT DETECTION BY NUCLEIC ACID (DNA OR RNA); SEVERE ACUTE RESPIRATORY SYNDROME CORONAVIRUS 2 (SARS-COV-2) (CORONAVIRUS DISEASE [COVID-19]), AMPLIFIED PROBE TECHNIQUE, MAKING USE OF HIGH THROUGHPUT TECHNOLOGIES AS DESCRIBED BY CMS-2020-01-R: HCPCS | Performed by: NURSE PRACTITIONER

## 2021-05-05 LAB — SARS-COV-2 RNA RESP QL NAA+PROBE: NOT DETECTED

## 2021-05-11 ENCOUNTER — LAB VISIT (OUTPATIENT)
Dept: LAB | Facility: OTHER | Age: 86
End: 2021-05-11
Payer: MEDICARE

## 2021-05-11 DIAGNOSIS — Z20.822 ENCOUNTER FOR LABORATORY TESTING FOR COVID-19 VIRUS: ICD-10-CM

## 2021-05-11 PROCEDURE — U0003 INFECTIOUS AGENT DETECTION BY NUCLEIC ACID (DNA OR RNA); SEVERE ACUTE RESPIRATORY SYNDROME CORONAVIRUS 2 (SARS-COV-2) (CORONAVIRUS DISEASE [COVID-19]), AMPLIFIED PROBE TECHNIQUE, MAKING USE OF HIGH THROUGHPUT TECHNOLOGIES AS DESCRIBED BY CMS-2020-01-R: HCPCS | Performed by: NURSE PRACTITIONER

## 2021-05-13 LAB — SARS-COV-2 RNA RESP QL NAA+PROBE: NOT DETECTED

## 2021-05-18 ENCOUNTER — LAB VISIT (OUTPATIENT)
Dept: LAB | Facility: OTHER | Age: 86
End: 2021-05-18
Payer: MEDICARE

## 2021-05-18 DIAGNOSIS — Z20.822 ENCOUNTER FOR LABORATORY TESTING FOR COVID-19 VIRUS: ICD-10-CM

## 2021-05-18 PROCEDURE — U0003 INFECTIOUS AGENT DETECTION BY NUCLEIC ACID (DNA OR RNA); SEVERE ACUTE RESPIRATORY SYNDROME CORONAVIRUS 2 (SARS-COV-2) (CORONAVIRUS DISEASE [COVID-19]), AMPLIFIED PROBE TECHNIQUE, MAKING USE OF HIGH THROUGHPUT TECHNOLOGIES AS DESCRIBED BY CMS-2020-01-R: HCPCS | Performed by: NURSE PRACTITIONER

## 2021-05-19 LAB — SARS-COV-2 RNA RESP QL NAA+PROBE: NOT DETECTED

## 2021-05-25 ENCOUNTER — LAB VISIT (OUTPATIENT)
Dept: LAB | Facility: OTHER | Age: 86
End: 2021-05-25
Payer: MEDICARE

## 2021-05-25 DIAGNOSIS — Z20.822 ENCOUNTER FOR LABORATORY TESTING FOR COVID-19 VIRUS: ICD-10-CM

## 2021-05-25 PROCEDURE — U0003 INFECTIOUS AGENT DETECTION BY NUCLEIC ACID (DNA OR RNA); SEVERE ACUTE RESPIRATORY SYNDROME CORONAVIRUS 2 (SARS-COV-2) (CORONAVIRUS DISEASE [COVID-19]), AMPLIFIED PROBE TECHNIQUE, MAKING USE OF HIGH THROUGHPUT TECHNOLOGIES AS DESCRIBED BY CMS-2020-01-R: HCPCS | Performed by: NURSE PRACTITIONER

## 2021-05-26 LAB — SARS-COV-2 RNA RESP QL NAA+PROBE: NOT DETECTED

## 2021-06-08 ENCOUNTER — LAB VISIT (OUTPATIENT)
Dept: LAB | Facility: OTHER | Age: 86
End: 2021-06-08
Payer: MEDICARE

## 2021-06-08 DIAGNOSIS — Z20.822 ENCOUNTER FOR LABORATORY TESTING FOR COVID-19 VIRUS: ICD-10-CM

## 2021-06-08 PROCEDURE — U0003 INFECTIOUS AGENT DETECTION BY NUCLEIC ACID (DNA OR RNA); SEVERE ACUTE RESPIRATORY SYNDROME CORONAVIRUS 2 (SARS-COV-2) (CORONAVIRUS DISEASE [COVID-19]), AMPLIFIED PROBE TECHNIQUE, MAKING USE OF HIGH THROUGHPUT TECHNOLOGIES AS DESCRIBED BY CMS-2020-01-R: HCPCS | Performed by: NURSE PRACTITIONER

## 2021-06-09 LAB — SARS-COV-2 RNA RESP QL NAA+PROBE: NOT DETECTED

## 2021-06-15 ENCOUNTER — LAB VISIT (OUTPATIENT)
Dept: LAB | Facility: OTHER | Age: 86
End: 2021-06-15
Payer: MEDICARE

## 2021-06-15 DIAGNOSIS — Z20.822 ENCOUNTER FOR LABORATORY TESTING FOR COVID-19 VIRUS: ICD-10-CM

## 2021-06-15 PROCEDURE — U0003 INFECTIOUS AGENT DETECTION BY NUCLEIC ACID (DNA OR RNA); SEVERE ACUTE RESPIRATORY SYNDROME CORONAVIRUS 2 (SARS-COV-2) (CORONAVIRUS DISEASE [COVID-19]), AMPLIFIED PROBE TECHNIQUE, MAKING USE OF HIGH THROUGHPUT TECHNOLOGIES AS DESCRIBED BY CMS-2020-01-R: HCPCS | Performed by: NURSE PRACTITIONER

## 2021-06-16 LAB — SARS-COV-2 RNA RESP QL NAA+PROBE: NOT DETECTED

## 2021-06-22 ENCOUNTER — LAB VISIT (OUTPATIENT)
Dept: LAB | Facility: OTHER | Age: 86
End: 2021-06-22
Payer: MEDICARE

## 2021-06-22 DIAGNOSIS — Z20.822 ENCOUNTER FOR LABORATORY TESTING FOR COVID-19 VIRUS: ICD-10-CM

## 2021-06-22 PROCEDURE — U0003 INFECTIOUS AGENT DETECTION BY NUCLEIC ACID (DNA OR RNA); SEVERE ACUTE RESPIRATORY SYNDROME CORONAVIRUS 2 (SARS-COV-2) (CORONAVIRUS DISEASE [COVID-19]), AMPLIFIED PROBE TECHNIQUE, MAKING USE OF HIGH THROUGHPUT TECHNOLOGIES AS DESCRIBED BY CMS-2020-01-R: HCPCS | Performed by: NURSE PRACTITIONER

## 2021-06-23 LAB — SARS-COV-2 RNA RESP QL NAA+PROBE: NOT DETECTED

## 2021-06-29 ENCOUNTER — LAB VISIT (OUTPATIENT)
Dept: LAB | Facility: OTHER | Age: 86
End: 2021-06-29
Payer: MEDICARE

## 2021-06-29 DIAGNOSIS — Z20.822 ENCOUNTER FOR LABORATORY TESTING FOR COVID-19 VIRUS: ICD-10-CM

## 2021-06-29 PROCEDURE — U0003 INFECTIOUS AGENT DETECTION BY NUCLEIC ACID (DNA OR RNA); SEVERE ACUTE RESPIRATORY SYNDROME CORONAVIRUS 2 (SARS-COV-2) (CORONAVIRUS DISEASE [COVID-19]), AMPLIFIED PROBE TECHNIQUE, MAKING USE OF HIGH THROUGHPUT TECHNOLOGIES AS DESCRIBED BY CMS-2020-01-R: HCPCS | Performed by: NURSE PRACTITIONER

## 2021-06-30 LAB — SARS-COV-2 RNA RESP QL NAA+PROBE: NOT DETECTED

## 2021-07-06 ENCOUNTER — LAB VISIT (OUTPATIENT)
Dept: LAB | Facility: OTHER | Age: 86
End: 2021-07-06
Payer: MEDICARE

## 2021-07-06 DIAGNOSIS — Z20.822 ENCOUNTER FOR LABORATORY TESTING FOR COVID-19 VIRUS: ICD-10-CM

## 2021-07-06 PROCEDURE — U0003 INFECTIOUS AGENT DETECTION BY NUCLEIC ACID (DNA OR RNA); SEVERE ACUTE RESPIRATORY SYNDROME CORONAVIRUS 2 (SARS-COV-2) (CORONAVIRUS DISEASE [COVID-19]), AMPLIFIED PROBE TECHNIQUE, MAKING USE OF HIGH THROUGHPUT TECHNOLOGIES AS DESCRIBED BY CMS-2020-01-R: HCPCS | Performed by: NURSE PRACTITIONER

## 2021-07-07 LAB
SARS-COV-2 RNA RESP QL NAA+PROBE: NOT DETECTED
SARS-COV-2- CYCLE NUMBER: -1

## 2021-07-13 ENCOUNTER — LAB VISIT (OUTPATIENT)
Dept: LAB | Facility: OTHER | Age: 86
End: 2021-07-13
Payer: MEDICARE

## 2021-07-13 DIAGNOSIS — Z20.822 ENCOUNTER FOR LABORATORY TESTING FOR COVID-19 VIRUS: ICD-10-CM

## 2021-07-13 PROCEDURE — U0003 INFECTIOUS AGENT DETECTION BY NUCLEIC ACID (DNA OR RNA); SEVERE ACUTE RESPIRATORY SYNDROME CORONAVIRUS 2 (SARS-COV-2) (CORONAVIRUS DISEASE [COVID-19]), AMPLIFIED PROBE TECHNIQUE, MAKING USE OF HIGH THROUGHPUT TECHNOLOGIES AS DESCRIBED BY CMS-2020-01-R: HCPCS | Performed by: NURSE PRACTITIONER

## 2021-07-14 LAB
SARS-COV-2 RNA RESP QL NAA+PROBE: NOT DETECTED
SARS-COV-2- CYCLE NUMBER: -1

## 2021-07-20 ENCOUNTER — LAB VISIT (OUTPATIENT)
Dept: LAB | Facility: OTHER | Age: 86
End: 2021-07-20
Payer: MEDICARE

## 2021-07-20 DIAGNOSIS — Z20.822 ENCOUNTER FOR LABORATORY TESTING FOR COVID-19 VIRUS: ICD-10-CM

## 2021-07-20 PROCEDURE — U0003 INFECTIOUS AGENT DETECTION BY NUCLEIC ACID (DNA OR RNA); SEVERE ACUTE RESPIRATORY SYNDROME CORONAVIRUS 2 (SARS-COV-2) (CORONAVIRUS DISEASE [COVID-19]), AMPLIFIED PROBE TECHNIQUE, MAKING USE OF HIGH THROUGHPUT TECHNOLOGIES AS DESCRIBED BY CMS-2020-01-R: HCPCS | Performed by: NURSE PRACTITIONER

## 2021-07-21 LAB
SARS-COV-2 RNA RESP QL NAA+PROBE: NOT DETECTED
SARS-COV-2- CYCLE NUMBER: -1

## 2023-07-06 NOTE — PROGRESS NOTES
Pharmacist Renal Dose Adjustment Note    Cedric Almaraz is a 86 y.o. male being treated with the medication enoxaparin    Patient Data:    Vital Signs (Most Recent):  Temp: 98.1 °F (36.7 °C) (04/07/20 0854)  Pulse: 79 (04/07/20 0854)  Resp: 19 (04/07/20 0854)  BP: 128/79 (04/07/20 0854)  SpO2: (!) 90 % (04/07/20 0854)   Vital Signs (72h Range):  Temp:  [97.7 °F (36.5 °C)-99.8 °F (37.7 °C)]   Pulse:  [50-95]   Resp:  [17-28]   BP: ()/(54-79)   SpO2:  [90 %-95 %]      Recent Labs   Lab 04/05/20 2127 04/07/20  0632   CREATININE 1.5* 1.1     Serum creatinine: 1.1 mg/dL 04/07/20 0632  Estimated creatinine clearance: 41.7 mL/min    Enoxaparin 30mg daily will be adjusted to 40mg daily    Pharmacist's Name: Marielena Cat  Pharmacist's Extension: [phone]     Mauc Instructions: By selecting yes to the question below the MAUC number will be added into the note.  This will be calculated automatically based on the diagnosis chosen, the size entered, the body zone selected (H,M,L) and the specific indications you chose. You will also have the option to override the Mohs AUC if you disagree with the automatically calculated number and this option is found in the Case Summary tab.